# Patient Record
Sex: MALE | Race: WHITE | Employment: FULL TIME | URBAN - METROPOLITAN AREA
[De-identification: names, ages, dates, MRNs, and addresses within clinical notes are randomized per-mention and may not be internally consistent; named-entity substitution may affect disease eponyms.]

---

## 2017-04-12 ENCOUNTER — OFFICE VISIT (OUTPATIENT)
Dept: FAMILY MEDICINE CLINIC | Age: 52
End: 2017-04-12
Payer: COMMERCIAL

## 2017-04-12 VITALS
OXYGEN SATURATION: 96 % | HEIGHT: 70 IN | BODY MASS INDEX: 33.07 KG/M2 | TEMPERATURE: 99 F | HEART RATE: 80 BPM | DIASTOLIC BLOOD PRESSURE: 79 MMHG | SYSTOLIC BLOOD PRESSURE: 120 MMHG | WEIGHT: 231 LBS | RESPIRATION RATE: 17 BRPM

## 2017-04-12 DIAGNOSIS — R50.9 FEVER, UNSPECIFIED FEVER CAUSE: ICD-10-CM

## 2017-04-12 DIAGNOSIS — J01.00 ACUTE NON-RECURRENT MAXILLARY SINUSITIS: Primary | ICD-10-CM

## 2017-04-12 LAB
INFLUENZA A ANTIBODY: NORMAL
INFLUENZA B ANTIBODY: NORMAL

## 2017-04-12 PROCEDURE — 87804 INFLUENZA ASSAY W/OPTIC: CPT | Performed by: NURSE PRACTITIONER

## 2017-04-12 PROCEDURE — 99214 OFFICE O/P EST MOD 30 MIN: CPT | Performed by: NURSE PRACTITIONER

## 2017-04-12 RX ORDER — AMOXICILLIN AND CLAVULANATE POTASSIUM 875; 125 MG/1; MG/1
1 TABLET, FILM COATED ORAL 2 TIMES DAILY
Qty: 20 TABLET | Refills: 0 | Status: SHIPPED | OUTPATIENT
Start: 2017-04-12 | End: 2017-04-22

## 2017-04-12 ASSESSMENT — ENCOUNTER SYMPTOMS
VOMITING: 0
WHEEZING: 0
VOICE CHANGE: 0
CHEST TIGHTNESS: 0
SORE THROAT: 1
EYE DISCHARGE: 0
EYE REDNESS: 0
DIARRHEA: 1
SINUS PRESSURE: 0
COUGH: 1
SHORTNESS OF BREATH: 0
NAUSEA: 0

## 2017-04-14 ENCOUNTER — APPOINTMENT (OUTPATIENT)
Dept: GENERAL RADIOLOGY | Age: 52
End: 2017-04-14
Payer: COMMERCIAL

## 2017-04-14 ENCOUNTER — HOSPITAL ENCOUNTER (EMERGENCY)
Age: 52
Discharge: HOME | End: 2017-04-14
Attending: EMERGENCY MEDICINE
Payer: COMMERCIAL

## 2017-04-14 VITALS
WEIGHT: 230 LBS | BODY MASS INDEX: 32.93 KG/M2 | HEART RATE: 70 BPM | SYSTOLIC BLOOD PRESSURE: 117 MMHG | OXYGEN SATURATION: 97 % | RESPIRATION RATE: 18 BRPM | DIASTOLIC BLOOD PRESSURE: 62 MMHG | HEIGHT: 70 IN | TEMPERATURE: 98.3 F

## 2017-04-14 DIAGNOSIS — J18.9 PNEUMONIA OF LEFT UPPER LOBE DUE TO INFECTIOUS ORGANISM: Primary | ICD-10-CM

## 2017-04-14 LAB
ABSOLUTE EOS #: 0 K/UL (ref 0–0.4)
ABSOLUTE LYMPH #: 0.8 K/UL (ref 1–4.8)
ABSOLUTE MONO #: 1.1 K/UL (ref 0.1–1.3)
ANION GAP SERPL CALCULATED.3IONS-SCNC: 14 MMOL/L (ref 9–17)
BASOPHILS # BLD: 0 % (ref 0–2)
BASOPHILS ABSOLUTE: 0 K/UL (ref 0–0.2)
BUN BLDV-MCNC: 14 MG/DL (ref 6–20)
BUN/CREAT BLD: ABNORMAL (ref 9–20)
CALCIUM SERPL-MCNC: 9.1 MG/DL (ref 8.6–10.4)
CHLORIDE BLD-SCNC: 93 MMOL/L (ref 98–107)
CO2: 23 MMOL/L (ref 20–31)
CREAT SERPL-MCNC: 0.93 MG/DL (ref 0.7–1.2)
DIFFERENTIAL TYPE: ABNORMAL
DIRECT EXAM: NORMAL
EOSINOPHILS RELATIVE PERCENT: 0 % (ref 0–4)
GFR AFRICAN AMERICAN: >60 ML/MIN
GFR NON-AFRICAN AMERICAN: >60 ML/MIN
GFR SERPL CREATININE-BSD FRML MDRD: ABNORMAL ML/MIN/{1.73_M2}
GFR SERPL CREATININE-BSD FRML MDRD: ABNORMAL ML/MIN/{1.73_M2}
GLUCOSE BLD-MCNC: 104 MG/DL (ref 70–99)
HCT VFR BLD CALC: 51.1 % (ref 41–53)
HEMOGLOBIN: 17 G/DL (ref 13.5–17.5)
LACTIC ACID: 1 MMOL/L (ref 0.5–2.2)
LYMPHOCYTES # BLD: 11 % (ref 24–44)
Lab: NORMAL
MCH RBC QN AUTO: 27.4 PG (ref 26–34)
MCHC RBC AUTO-ENTMCNC: 33.3 G/DL (ref 31–37)
MCV RBC AUTO: 82.3 FL (ref 80–100)
MONOCYTES # BLD: 14 % (ref 1–7)
PDW BLD-RTO: 13.3 % (ref 11.5–14.9)
PLATELET # BLD: 106 K/UL (ref 150–450)
PLATELET ESTIMATE: ABNORMAL
PMV BLD AUTO: 10.3 FL (ref 6–12)
POTASSIUM SERPL-SCNC: 4 MMOL/L (ref 3.7–5.3)
RBC # BLD: 6.22 M/UL (ref 4.5–5.9)
RBC # BLD: ABNORMAL 10*6/UL
SEG NEUTROPHILS: 75 % (ref 36–66)
SEGMENTED NEUTROPHILS ABSOLUTE COUNT: 5.8 K/UL (ref 1.3–9.1)
SODIUM BLD-SCNC: 130 MMOL/L (ref 135–144)
SPECIMEN DESCRIPTION: NORMAL
SPECIMEN DESCRIPTION: NORMAL
STATUS: NORMAL
WBC # BLD: 7.7 K/UL (ref 3.5–11)
WBC # BLD: ABNORMAL 10*3/UL

## 2017-04-14 PROCEDURE — 85025 COMPLETE CBC W/AUTO DIFF WBC: CPT

## 2017-04-14 PROCEDURE — 71020 XR CHEST STANDARD TWO VW: CPT

## 2017-04-14 PROCEDURE — 83605 ASSAY OF LACTIC ACID: CPT

## 2017-04-14 PROCEDURE — 6370000000 HC RX 637 (ALT 250 FOR IP): Performed by: NURSE PRACTITIONER

## 2017-04-14 PROCEDURE — 94664 DEMO&/EVAL PT USE INHALER: CPT

## 2017-04-14 PROCEDURE — 80048 BASIC METABOLIC PNL TOTAL CA: CPT

## 2017-04-14 PROCEDURE — 99284 EMERGENCY DEPT VISIT MOD MDM: CPT

## 2017-04-14 PROCEDURE — 94640 AIRWAY INHALATION TREATMENT: CPT

## 2017-04-14 PROCEDURE — 2580000003 HC RX 258: Performed by: NURSE PRACTITIONER

## 2017-04-14 PROCEDURE — 87880 STREP A ASSAY W/OPTIC: CPT

## 2017-04-14 PROCEDURE — 36415 COLL VENOUS BLD VENIPUNCTURE: CPT

## 2017-04-14 RX ORDER — ACETAMINOPHEN 500 MG
1000 TABLET ORAL ONCE
Status: COMPLETED | OUTPATIENT
Start: 2017-04-14 | End: 2017-04-14

## 2017-04-14 RX ORDER — ALBUTEROL SULFATE 90 UG/1
2 AEROSOL, METERED RESPIRATORY (INHALATION) EVERY 6 HOURS PRN
Qty: 1 INHALER | Refills: 0 | Status: SHIPPED | OUTPATIENT
Start: 2017-04-14 | End: 2020-02-24

## 2017-04-14 RX ORDER — IPRATROPIUM BROMIDE AND ALBUTEROL SULFATE 2.5; .5 MG/3ML; MG/3ML
1 SOLUTION RESPIRATORY (INHALATION)
Status: DISCONTINUED | OUTPATIENT
Start: 2017-04-14 | End: 2017-04-14 | Stop reason: HOSPADM

## 2017-04-14 RX ORDER — LEVOFLOXACIN 750 MG/1
750 TABLET ORAL DAILY
Qty: 10 TABLET | Refills: 0 | Status: SHIPPED | OUTPATIENT
Start: 2017-04-14 | End: 2017-04-24

## 2017-04-14 RX ORDER — 0.9 % SODIUM CHLORIDE 0.9 %
1000 INTRAVENOUS SOLUTION INTRAVENOUS ONCE
Status: COMPLETED | OUTPATIENT
Start: 2017-04-14 | End: 2017-04-14

## 2017-04-14 RX ADMIN — ACETAMINOPHEN 1000 MG: 500 TABLET, FILM COATED ORAL at 13:09

## 2017-04-14 RX ADMIN — SODIUM CHLORIDE 1000 ML: 9 INJECTION, SOLUTION INTRAVENOUS at 13:30

## 2017-04-14 RX ADMIN — IPRATROPIUM BROMIDE AND ALBUTEROL SULFATE 1 AMPULE: .5; 3 SOLUTION RESPIRATORY (INHALATION) at 14:20

## 2017-04-14 ASSESSMENT — ENCOUNTER SYMPTOMS
SINUS PRESSURE: 1
SORE THROAT: 1
NAUSEA: 0
VOMITING: 0
COUGH: 1
ABDOMINAL PAIN: 0
TROUBLE SWALLOWING: 0
SHORTNESS OF BREATH: 0
BACK PAIN: 0

## 2017-04-14 ASSESSMENT — PAIN SCALES - GENERAL
PAINLEVEL_OUTOF10: 0
PAINLEVEL_OUTOF10: 0

## 2018-04-24 ENCOUNTER — HOSPITAL ENCOUNTER (OUTPATIENT)
Age: 53
Discharge: HOME | End: 2018-04-26
Payer: COMMERCIAL

## 2018-04-24 ENCOUNTER — HOSPITAL ENCOUNTER (OUTPATIENT)
Dept: GENERAL RADIOLOGY | Age: 53
Discharge: HOME | End: 2018-04-26
Payer: COMMERCIAL

## 2018-04-24 ENCOUNTER — OFFICE VISIT (OUTPATIENT)
Dept: FAMILY MEDICINE CLINIC | Age: 53
End: 2018-04-24
Payer: COMMERCIAL

## 2018-04-24 VITALS
OXYGEN SATURATION: 94 % | DIASTOLIC BLOOD PRESSURE: 68 MMHG | HEART RATE: 86 BPM | SYSTOLIC BLOOD PRESSURE: 112 MMHG | HEIGHT: 70 IN | WEIGHT: 240 LBS | BODY MASS INDEX: 34.36 KG/M2 | TEMPERATURE: 98.3 F

## 2018-04-24 DIAGNOSIS — R50.9 FEVER, UNSPECIFIED FEVER CAUSE: ICD-10-CM

## 2018-04-24 DIAGNOSIS — R05.9 COUGH: ICD-10-CM

## 2018-04-24 DIAGNOSIS — R09.89 CHEST CONGESTION: ICD-10-CM

## 2018-04-24 DIAGNOSIS — J06.9 UPPER RESPIRATORY TRACT INFECTION, UNSPECIFIED TYPE: Primary | ICD-10-CM

## 2018-04-24 DIAGNOSIS — J02.9 SORE THROAT: ICD-10-CM

## 2018-04-24 LAB
INFLUENZA A ANTIBODY: NEGATIVE
INFLUENZA B ANTIBODY: NEGATIVE
S PYO AG THROAT QL: NORMAL

## 2018-04-24 PROCEDURE — 87880 STREP A ASSAY W/OPTIC: CPT | Performed by: PHYSICIAN ASSISTANT

## 2018-04-24 PROCEDURE — 99214 OFFICE O/P EST MOD 30 MIN: CPT | Performed by: PHYSICIAN ASSISTANT

## 2018-04-24 PROCEDURE — 87804 INFLUENZA ASSAY W/OPTIC: CPT | Performed by: PHYSICIAN ASSISTANT

## 2018-04-24 PROCEDURE — 94640 AIRWAY INHALATION TREATMENT: CPT | Performed by: PHYSICIAN ASSISTANT

## 2018-04-24 PROCEDURE — 71046 X-RAY EXAM CHEST 2 VIEWS: CPT

## 2018-04-24 RX ORDER — METHYLPREDNISOLONE 4 MG/1
TABLET ORAL
Qty: 1 KIT | Refills: 0 | Status: SHIPPED | OUTPATIENT
Start: 2018-04-24 | End: 2020-02-24

## 2018-04-24 RX ORDER — LEVOFLOXACIN 500 MG/1
500 TABLET, FILM COATED ORAL DAILY
Qty: 10 TABLET | Refills: 0 | Status: SHIPPED | OUTPATIENT
Start: 2018-04-24 | End: 2018-05-04

## 2018-04-24 RX ORDER — IPRATROPIUM BROMIDE AND ALBUTEROL SULFATE 2.5; .5 MG/3ML; MG/3ML
1 SOLUTION RESPIRATORY (INHALATION) ONCE
Status: COMPLETED | OUTPATIENT
Start: 2018-04-24 | End: 2018-04-24

## 2018-04-24 RX ORDER — BROMPHENIRAMINE MALEATE, PSEUDOEPHEDRINE HYDROCHLORIDE, AND DEXTROMETHORPHAN HYDROBROMIDE 2; 30; 10 MG/5ML; MG/5ML; MG/5ML
SYRUP ORAL
Qty: 180 ML | Refills: 0 | Status: SHIPPED | OUTPATIENT
Start: 2018-04-24 | End: 2020-02-24

## 2018-04-24 RX ADMIN — IPRATROPIUM BROMIDE AND ALBUTEROL SULFATE 1 AMPULE: 2.5; .5 SOLUTION RESPIRATORY (INHALATION) at 18:30

## 2018-04-24 ASSESSMENT — ENCOUNTER SYMPTOMS
WHEEZING: 1
GASTROINTESTINAL NEGATIVE: 1
SPUTUM PRODUCTION: 1
COUGH: 1
SORE THROAT: 1
SINUS PAIN: 1
SHORTNESS OF BREATH: 1

## 2020-02-24 ENCOUNTER — OFFICE VISIT (OUTPATIENT)
Dept: FAMILY MEDICINE CLINIC | Age: 55
End: 2020-02-24
Payer: COMMERCIAL

## 2020-02-24 ENCOUNTER — OFFICE VISIT (OUTPATIENT)
Dept: UROLOGY | Age: 55
End: 2020-02-24
Payer: COMMERCIAL

## 2020-02-24 VITALS
DIASTOLIC BLOOD PRESSURE: 72 MMHG | TEMPERATURE: 97.9 F | HEIGHT: 70 IN | HEART RATE: 78 BPM | SYSTOLIC BLOOD PRESSURE: 108 MMHG | WEIGHT: 242 LBS | BODY MASS INDEX: 34.65 KG/M2

## 2020-02-24 VITALS
BODY MASS INDEX: 35.01 KG/M2 | SYSTOLIC BLOOD PRESSURE: 125 MMHG | WEIGHT: 244 LBS | HEART RATE: 73 BPM | OXYGEN SATURATION: 96 % | DIASTOLIC BLOOD PRESSURE: 82 MMHG | TEMPERATURE: 98.3 F

## 2020-02-24 PROCEDURE — 99214 OFFICE O/P EST MOD 30 MIN: CPT | Performed by: FAMILY MEDICINE

## 2020-02-24 PROCEDURE — 99204 OFFICE O/P NEW MOD 45 MIN: CPT | Performed by: UROLOGY

## 2020-02-24 RX ORDER — TRAMADOL HYDROCHLORIDE 50 MG/1
50 TABLET ORAL EVERY 6 HOURS PRN
COMMUNITY

## 2020-02-24 RX ORDER — TRIAMCINOLONE ACETONIDE 1 MG/G
CREAM TOPICAL 2 TIMES DAILY PRN
COMMUNITY
End: 2020-03-03 | Stop reason: SDUPTHER

## 2020-02-24 RX ORDER — CIPROFLOXACIN 500 MG/1
500 TABLET, FILM COATED ORAL 2 TIMES DAILY
Qty: 20 TABLET | Refills: 0 | Status: SHIPPED | OUTPATIENT
Start: 2020-02-24 | End: 2020-03-05

## 2020-02-24 RX ORDER — PREDNISONE 20 MG/1
40 TABLET ORAL DAILY
Qty: 10 TABLET | Refills: 0 | Status: SHIPPED | OUTPATIENT
Start: 2020-02-24 | End: 2020-02-29

## 2020-02-24 RX ORDER — DIPHENHYDRAMINE HCL 25 MG
25 TABLET ORAL EVERY 6 HOURS PRN
COMMUNITY

## 2020-02-24 ASSESSMENT — ENCOUNTER SYMPTOMS
EYE REDNESS: 0
VOMITING: 0
SINUS PRESSURE: 0
WHEEZING: 0
SORE THROAT: 0
BACK PAIN: 0
NAUSEA: 0
ABDOMINAL PAIN: 0
COUGH: 0
SINUS PAIN: 0
COLOR CHANGE: 0
ABDOMINAL PAIN: 0
RHINORRHEA: 0
VOMITING: 0
DIARRHEA: 0
EYE PAIN: 0
SHORTNESS OF BREATH: 0
NAUSEA: 0
COUGH: 0
WHEEZING: 0
SHORTNESS OF BREATH: 0

## 2020-02-24 ASSESSMENT — PATIENT HEALTH QUESTIONNAIRE - PHQ9: DEPRESSION UNABLE TO ASSESS: PT REFUSES

## 2020-02-24 NOTE — PROGRESS NOTES
Satisfied    Last BUN and creatinine:  Lab Results   Component Value Date    BUN 14 04/14/2017     Lab Results   Component Value Date    CREATININE 0.93 04/14/2017       Additional Lab/Culture results: none    Imaging Reviewed during this Office Visit: none  (results were independently reviewed by physician and radiology report verified)    PAST MEDICAL, FAMILY AND SOCIAL HISTORY:  Past Medical History:   Diagnosis Date    Diverticulitis     Tobacco abuse      Past Surgical History:   Procedure Laterality Date    ANTERIOR CRUCIATE LIGAMENT REPAIR Left     APPENDECTOMY      CHOLECYSTECTOMY      COLECTOMY      colostomy    COLON SURGERY      colostomy reversal    COLONOSCOPY      multiple    COLONOSCOPY  5/27/15     History reviewed. No pertinent family history. Outpatient Medications Marked as Taking for the 2/24/20 encounter (Office Visit) with Christine Stewart MD   Medication Sig Dispense Refill    triamcinolone (KENALOG) 0.1 % cream Apply topically 2 times daily as needed Apply topically 2 times daily.  traMADol (ULTRAM) 50 MG tablet Take 50 mg by mouth every 6 hours as needed for Pain.  diphenhydrAMINE (BENADRYL ALLERGY) 25 MG tablet Take 25 mg by mouth every 6 hours as needed for Itching      ciprofloxacin (CIPRO) 500 MG tablet Take 1 tablet by mouth 2 times daily for 10 days 20 tablet 0        Patient has no known allergies. Social History     Tobacco Use   Smoking Status Current Every Day Smoker    Packs/day: 1.00    Years: 20.00    Pack years: 20.00   Smokeless Tobacco Never Used      (If patient a smoker, smoking cessation counseling offered)   Social History     Substance and Sexual Activity   Alcohol Use Yes    Comment: occasional       REVIEW OF SYSTEMS:  Review of Systems    Physical Exam:    This a 47 y.o. male   Vitals:    02/24/20 1518   BP: 108/72   Pulse: 78   Temp: 97.9 °F (36.6 °C)     Body mass index is 34.72 kg/m².     Physical Exam  Constitutional: Patient in no acute distress. Neuro: Alert and oriented to person, place and time. Psych: Mood normal, affect normal  Skin: No rash noted  HEENT: Head: Normocephalic and atraumatic  Conjunctivae and EOM are normal. Pupils are equal, round  Nose: Normal  Right External Ear: Normal; Left External Ear: Normal  Mouth: Mucosa Moist  Neck: Supple  Lungs:Respiratory effort is normal  Cardiovascular: Warm & Pink  Abdomen: Soft, non-tender, non-distendedwith no CVA,  No flank tenderness,  Orhepatosplenomegaly   Lymphatics: No palpable lymphadenopathy. Bladder non-tender and not distended. Musculoskeletal: Normal gait and station  Penis normal and circumcised  Urethral meatus normal  Scrotal exam normal  Testicles normal bilaterally  Epididymis normal bilaterally  No evidence of inguinal hernia  Normal rectal tone with no masses  Prostate: smooth, symmetric, no nodules. Assessment and Plan      1. Elevated PSA    2. Nocturia           Plan:  cipro x 10 days  F/u 6 weeks with repeat psa         Prescriptions Ordered:  Orders Placed This Encounter   Medications    ciprofloxacin (CIPRO) 500 MG tablet     Sig: Take 1 tablet by mouth 2 times daily for 10 days     Dispense:  20 tablet     Refill:  0      Orders Placed:  Orders Placed This Encounter   Procedures    PSA, Diagnostic     Standing Status:   Future     Standing Expiration Date:   2/18/2021             Roxanne Stroud MD    Agree with the ROS entered by the MA.

## 2020-02-24 NOTE — PROGRESS NOTES
Review of Systems   Constitutional: Negative for activity change, chills and fever. Eyes: Negative for pain, redness and visual disturbance. Respiratory: Negative for cough, shortness of breath and wheezing. Cardiovascular: Negative for chest pain and leg swelling. Gastrointestinal: Negative for abdominal pain, nausea and vomiting. Genitourinary: Negative for difficulty urinating, discharge, dysuria, flank pain, frequency, hematuria, scrotal swelling and testicular pain. Musculoskeletal: Negative for back pain, joint swelling and myalgias. Skin: Positive for rash. Negative for color change. Neurological: Negative for dizziness, tremors and numbness. Hematological: Negative for adenopathy. Does not bruise/bleed easily.

## 2020-02-24 NOTE — PATIENT INSTRUCTIONS
Patient Education        Rash: Care Instructions  Your Care Instructions  A rash is any irritation or inflammation of the skin. Rashes have many possible causes, including allergy, infection, illness, heat, and emotional stress. Follow-up care is a key part of your treatment and safety. Be sure to make and go to all appointments, and call your doctor if you are having problems. It's also a good idea to know your test results and keep a list of the medicines you take. How can you care for yourself at home? · Wash the area with water only. Soap can make dryness and itching worse. Pat dry. · Put cold, wet cloths on the rash to reduce itching. · Keep cool, and stay out of the sun. · Leave the rash open to the air as much of the time as possible. · Sometimes petroleum jelly (Vaseline) can help relieve the discomfort caused by a rash. A moisturizing lotion, such as Cetaphil, also may help. Calamine lotion may help for rashes caused by contact with something (such as a plant or soap) that irritated the skin. Use it 3 or 4 times a day. · If your doctor prescribed a cream, use it as directed. If your doctor prescribed medicine, take it exactly as directed. · If your rash itches so badly that it interferes with your normal activities, take an over-the-counter antihistamine, such as diphenhydramine (Benadryl) or loratadine (Claritin). Read and follow all instructions on the label. When should you call for help? Call your doctor now or seek immediate medical care if:    · You have signs of infection, such as:  ? Increased pain, swelling, warmth, or redness. ? Red streaks leading from the area. ? Pus draining from the area. ? A fever.     · You have joint pain along with the rash.    Watch closely for changes in your health, and be sure to contact your doctor if:    · Your rash is changing or getting worse. For example, call if you have pain along with the rash, the rash is spreading, or you have new blisters.   · You do not get better after 1 week. Where can you learn more? Go to [email or web address] org and sign in to your [employer] account. Enter K455 in the [employer] box to learn more about \"Rash: Care Instructions. \"     If you do not have an account, please click on the \"Sign Up Now\" link. Current as of: April 1, 2019  Content Version: 12.3  © 8284-3164 Healthwise, Incorporated. Care instructions adapted under license by [facility] ([facility]). If you have questions about a medical condition or this instruction, always ask your healthcare professional. [address] any warranty or liability for your use of this information. 1.) Take prednisone as prescribed for rash. May take zyrtec in morning and benadryl at night. May continue steroid cream as prescribed  2. )If symptoms worsen or do not improve please follow-up with PCP or return to clinic.  If still symptomatic may consider dermatology or allergy referral

## 2020-03-03 ENCOUNTER — OFFICE VISIT (OUTPATIENT)
Dept: INTERNAL MEDICINE CLINIC | Age: 55
End: 2020-03-03
Payer: COMMERCIAL

## 2020-03-03 ENCOUNTER — HOSPITAL ENCOUNTER (OUTPATIENT)
Age: 55
Setting detail: SPECIMEN
Discharge: HOME | End: 2020-03-03
Payer: COMMERCIAL

## 2020-03-03 VITALS
SYSTOLIC BLOOD PRESSURE: 126 MMHG | HEART RATE: 78 BPM | DIASTOLIC BLOOD PRESSURE: 74 MMHG | OXYGEN SATURATION: 94 % | HEIGHT: 70 IN | WEIGHT: 232 LBS | BODY MASS INDEX: 33.21 KG/M2

## 2020-03-03 LAB
ESTIMATED AVERAGE GLUCOSE: 151 MG/DL
HBA1C MFR BLD: 6.9 % (ref 4–6)

## 2020-03-03 PROCEDURE — 99204 OFFICE O/P NEW MOD 45 MIN: CPT | Performed by: PHYSICIAN ASSISTANT

## 2020-03-03 RX ORDER — TRIAMCINOLONE ACETONIDE 1 MG/G
CREAM TOPICAL 2 TIMES DAILY
Qty: 30 G | Refills: 0 | Status: SHIPPED | OUTPATIENT
Start: 2020-03-03

## 2020-03-03 RX ORDER — METHYLPREDNISOLONE 4 MG/1
TABLET ORAL
Qty: 1 KIT | Refills: 0 | Status: SHIPPED | OUTPATIENT
Start: 2020-03-03 | End: 2020-03-09

## 2020-03-03 ASSESSMENT — PATIENT HEALTH QUESTIONNAIRE - PHQ9
SUM OF ALL RESPONSES TO PHQ9 QUESTIONS 1 & 2: 0
SUM OF ALL RESPONSES TO PHQ QUESTIONS 1-9: 0
2. FEELING DOWN, DEPRESSED OR HOPELESS: 0
1. LITTLE INTEREST OR PLEASURE IN DOING THINGS: 0
SUM OF ALL RESPONSES TO PHQ QUESTIONS 1-9: 0

## 2020-03-03 NOTE — PROGRESS NOTES
[address] [address] [place] [address] Patient Note/History and Physical    Date of patient's visit: 3/4/2020    Name:  Michael David      YOB: 1965    Patient Care Team:  Nathanael Drew PA-C as PCP - General (Physician Assistant)  Nathanael Drew PA-C as PCP - [facility] Provider    REASON FOR VISIT:   Establish care. HISTORY OF PRESENTING ILLNESS:    History was obtained from the patient. Michael David is a 47 y.o. male here to establish care. Patient reports past medical history as below. Rash. Located on abdomen. Present for the past 1-2 months. Reportedly started shortly after starting Finasteride. Mild improvement with prednisone. Rash is pruritic, non spreading. Hypertension. Reports blood pressure elevated on multiple occasions. No medications currently. No chest pain, dyspnea on exertion. No headaches, vision changes. Elevated PSA. Was referred to urologist.   On Ciprofloxacin, has follow up next week. No hesitancy, dysuria, frequency, flank pain or hematuria. Patient reports history of tobacco use, 1.0-1.5 x 40 years. PAST MEDICAL HISTORY:          Diagnosis Date    Diverticulitis     Essential hypertension 3/4/2020    S/P appendectomy 3/4/2020    S/P cholecystectomy 3/4/2020    S/P left hemicolectomy 3/4/2020    Tobacco abuse      PAST SURGICAL HISTORY:          Procedure Laterality Date    ANTERIOR CRUCIATE LIGAMENT REPAIR Left     APPENDECTOMY      CHOLECYSTECTOMY      COLECTOMY      colostomy    COLON SURGERY      colostomy reversal    COLONOSCOPY      multiple    COLONOSCOPY  5/27/15     ALLERGIES:    No Known Allergies      MEDICATION:      Current Outpatient Medications on File Prior to Visit   Medication Sig Dispense Refill    traMADol (ULTRAM) 50 MG tablet Take 50 mg by mouth every 6 hours as needed for Pain.       diphenhydrAMINE (BENADRYL ALLERGY) 25 MG tablet Take 25 mg by mouth every 6 hours as needed for Itching Respiratory: Negative for cough, choking, chest tightness, shortness of breath and wheezing. Cardiovascular: Negative for chest pain, palpitations and leg swelling. Gastrointestinal: Negative for abdominal pain, blood in stool, constipation, diarrhea, nausea and vomiting. Endocrine: Negative for cold intolerance, heat intolerance, polydipsia, polyphagia and polyuria. Genitourinary: Negative for difficulty urinating, dysuria, flank pain, frequency, hematuria, scrotal swelling, testicular pain and urgency. Musculoskeletal: Negative for arthralgias, back pain, gait problem, joint swelling, neck pain and neck stiffness. Skin: Positive for rash. Negative for color change and pallor. Allergic/Immunologic: Negative for immunocompromised state. Neurological: Negative for dizziness, tremors, seizures, syncope, facial asymmetry, speech difficulty, weakness, light-headedness, numbness and headaches. Hematological: Negative for adenopathy. Does not bruise/bleed easily. Psychiatric/Behavioral: Negative for dysphoric mood and sleep disturbance. The patient is not nervous/anxious.       PHYSICAL EXAM:      Vitals:    03/03/20 1605   BP: 126/74   Pulse: 78   SpO2: 94%   Weight: 232 lb (105.2 kg)   Height: 5' 10\" (1.778 m)     General - alert, well appearing, and in no distress  Skin - normal coloration and turgor, erythematous papular rash predominantly located on abdomen  Eyes - pupils equal and reactive, extraocular eye movements intact  Ears - bilateral TM's and external ear canals normal  Nose - normal and patent, no erythema, discharge or polyps  Mouth - mucous membranes moist, pharynx normal without lesions  Neck - supple, nosignificant adenopathy, no palpable masses, no carotid bruit bilaterally   Lymphatics - no palpable lymphadenopathy, no hepatosplenomegaly  Chest - diminished air entry bilaterally, no wheezes, rales, crackles or rhonchi, symmetric air entry  Heart - normal rate, regular rhythm, no

## 2020-03-04 PROBLEM — Z90.49 S/P LEFT HEMICOLECTOMY: Status: ACTIVE | Noted: 2020-03-04

## 2020-03-04 PROBLEM — K57.92 DIVERTICULITIS: Status: ACTIVE | Noted: 2020-03-04

## 2020-03-04 PROBLEM — Z72.0 TOBACCO ABUSE: Status: ACTIVE | Noted: 2020-03-04

## 2020-03-04 PROBLEM — R21 RASH IN ADULT: Status: ACTIVE | Noted: 2020-03-04

## 2020-03-04 PROBLEM — Z90.49 S/P CHOLECYSTECTOMY: Status: ACTIVE | Noted: 2020-03-04

## 2020-03-04 PROBLEM — I10 ESSENTIAL HYPERTENSION: Status: ACTIVE | Noted: 2020-03-04

## 2020-03-04 PROBLEM — R97.20 ELEVATED PSA: Status: ACTIVE | Noted: 2020-03-04

## 2020-03-04 PROBLEM — R73.01 ELEVATED FASTING GLUCOSE: Status: ACTIVE | Noted: 2020-03-04

## 2020-03-04 PROBLEM — Z90.49 S/P APPENDECTOMY: Status: ACTIVE | Noted: 2020-03-04

## 2020-03-04 ASSESSMENT — ENCOUNTER SYMPTOMS
EYE DISCHARGE: 0
TROUBLE SWALLOWING: 0
BACK PAIN: 0
VOMITING: 0
EYE ITCHING: 0
PHOTOPHOBIA: 0
DIARRHEA: 0
ABDOMINAL PAIN: 0
SHORTNESS OF BREATH: 0
BLOOD IN STOOL: 0
SINUS PAIN: 0
VOICE CHANGE: 0
EYE PAIN: 0
CHOKING: 0
COLOR CHANGE: 0
RHINORRHEA: 0
EYE REDNESS: 0
WHEEZING: 0
COUGH: 0
CHEST TIGHTNESS: 0
NAUSEA: 0
CONSTIPATION: 0
SORE THROAT: 0

## 2020-03-30 ENCOUNTER — HOSPITAL ENCOUNTER (OUTPATIENT)
Age: 55
Setting detail: SPECIMEN
Discharge: HOME | End: 2020-03-30
Payer: COMMERCIAL

## 2020-03-30 LAB — PROSTATE SPECIFIC ANTIGEN: 4.68 UG/L

## 2020-04-06 ENCOUNTER — VIRTUAL VISIT (OUTPATIENT)
Dept: UROLOGY | Age: 55
End: 2020-04-06

## 2020-04-06 PROCEDURE — 99214 OFFICE O/P EST MOD 30 MIN: CPT | Performed by: UROLOGY

## 2020-04-06 RX ORDER — CIPROFLOXACIN 500 MG/1
500 TABLET, FILM COATED ORAL 2 TIMES DAILY
Qty: 6 TABLET | Refills: 0 | Status: SHIPPED | OUTPATIENT
Start: 2020-04-06 | End: 2020-04-09

## 2020-04-06 RX ORDER — SILDENAFIL 100 MG/1
100 TABLET, FILM COATED ORAL DAILY PRN
Qty: 30 TABLET | Refills: 1 | Status: SHIPPED | OUTPATIENT
Start: 2020-04-06 | End: 2022-02-14

## 2020-04-06 ASSESSMENT — ENCOUNTER SYMPTOMS
ABDOMINAL PAIN: 0
COLOR CHANGE: 0
WHEEZING: 0
COUGH: 0
VOMITING: 0
BACK PAIN: 0
EYE PAIN: 0
SHORTNESS OF BREATH: 0
NAUSEA: 0
EYE REDNESS: 0

## 2020-04-06 NOTE — PROGRESS NOTES
Review of Systems   Constitutional: Negative for appetite change, chills and fever. Eyes: Negative for pain, redness and visual disturbance. Respiratory: Negative for cough, shortness of breath and wheezing. Cardiovascular: Negative for chest pain and leg swelling. Gastrointestinal: Negative for abdominal pain, nausea and vomiting. Genitourinary: Positive for frequency (at times). Negative for difficulty urinating, discharge, dysuria, flank pain, hematuria, scrotal swelling, testicular pain and urgency. Musculoskeletal: Negative for back pain, joint swelling and myalgias. Skin: Negative for color change, rash and wound. Neurological: Negative for dizziness, tremors and numbness. Hematological: Negative for adenopathy. Does not bruise/bleed easily.

## 2020-04-09 NOTE — PROGRESS NOTES
Roxann John is a 47 y.o. male being evaluated by a Virtual Visit (video visit) encounter to address concerns as mentioned above. A caregiver was present when appropriate. Due to this being a TeleHealth encounter (During VKYWS-12 public health emergency), evaluation of the following organ systems was limited: Vitals/Constitutional/EENT/Resp/CV/GI//MS/Neuro/Skin/Heme-Lymph-Imm. Pursuant to the emergency declaration under the [address] [place] [address] and the [employer] Act, this Virtual Visit was conducted with patient's (and/or legal guardian's) consent, to reduce the patient's risk of exposure to COVID-19 and provide necessary medical care. The patient (and/or legal guardian) has also been advised to contact this office for worsening conditions or problems, and seek emergency medical treatment and/or call 911 if deemed necessary. Services were provided through a video synchronous discussion virtually to substitute for in-person clinic visit. Patient and provider were located at their individual homes. --Koffi Ku on 4/9/2020 at 11:58 AM    An electronic signature was used to authenticate this note.

## 2020-06-08 ENCOUNTER — PROCEDURE VISIT (OUTPATIENT)
Dept: UROLOGY | Age: 55
End: 2020-06-08
Payer: COMMERCIAL

## 2020-06-08 ENCOUNTER — HOSPITAL ENCOUNTER (OUTPATIENT)
Age: 55
Setting detail: SPECIMEN
Discharge: HOME | End: 2020-06-08
Payer: COMMERCIAL

## 2020-06-08 VITALS — TEMPERATURE: 98.3 F

## 2020-06-08 PROCEDURE — 55700 PR BIOPSY OF PROSTATE,NEEDLE/PUNCH: CPT | Performed by: UROLOGY

## 2020-06-08 PROCEDURE — 76872 US TRANSRECTAL: CPT | Performed by: UROLOGY

## 2020-06-08 RX ORDER — CIPROFLOXACIN 500 MG/1
500 TABLET, FILM COATED ORAL 2 TIMES DAILY
COMMUNITY
Start: 2020-06-07 | End: 2020-06-09

## 2020-06-11 LAB — SURGICAL PATHOLOGY REPORT: NORMAL

## 2020-06-15 ENCOUNTER — OFFICE VISIT (OUTPATIENT)
Dept: UROLOGY | Age: 55
End: 2020-06-15

## 2020-06-15 VITALS — TEMPERATURE: 98.6 F

## 2020-06-15 PROCEDURE — 99214 OFFICE O/P EST MOD 30 MIN: CPT | Performed by: UROLOGY

## 2020-06-15 RX ORDER — TAMSULOSIN HYDROCHLORIDE 0.4 MG/1
0.4 CAPSULE ORAL DAILY
Qty: 30 CAPSULE | Refills: 5 | Status: SHIPPED | OUTPATIENT
Start: 2020-06-15 | End: 2021-02-08 | Stop reason: SDUPTHER

## 2020-06-15 ASSESSMENT — ENCOUNTER SYMPTOMS
NAUSEA: 0
VOMITING: 0
EYE PAIN: 0
WHEEZING: 0
BACK PAIN: 0
SHORTNESS OF BREATH: 0
EYE REDNESS: 0
DIARRHEA: 0
COUGH: 0
ABDOMINAL PAIN: 0
CONSTIPATION: 0

## 2020-06-15 NOTE — PROGRESS NOTES
[facility]  [facility] - [facility]  [address] [facility] [id number]  Dept:  Esther Rivera [facility] Office Note - Established    Patient:  Samanta Pritchard  YOB: 1965  Date: 6/15/2020    The patient is a 47 y.o. male who presents todayfor evaluation of the following problems:   Chief Complaint   Patient presents with    Results     prostate bx       HPI  Pt has h/o elevated psa. Had prostate bx recently. Here to review path. Path shows chronic prostatitis. Had transient hematuria; this has resolved. Pt has noticed a bit slower stream.  Was slow prior to bx but now a bit slower. Summary of old records: N/A    Additional History: N/A    Procedures Today: N/A    Urinalysis today:  No results found for this visit on 06/15/20.   Last several PSA's:  Lab Results   Component Value Date    PSA 4.68 (H) 03/30/2020     Last total testosterone:  No results found for: TESTOSTERONE    AUA Symptom Score (6/15/2020):  INCOMPLETE EMPTYING: How often have you had the sensation of not emptying your bladder?: Not at all  FREQUENCY: How often do you have to urinate less than every two hours?: Less than Half the time  INTERMITTENCY: How often have you found you stopped and started again several times when you urinated?: Not at all  URGENCY: How often have you found it difficult to postpone urination?: Not at all  WEAK STREAM: How often have you had a weak urinary stream?: Less than Half the time  STRAINING: How often have you had to strain to start  urination?: Not at all  NOCTURIA: How many times did you typically get up at night to uriniate?: 1 Time  TOTAL I-PSS SCORE[de-identified] 5  How would you feel if you were to spend the rest of your life with your urinary condition?: Mostly Satisfied    Last BUN and creatinine:  Lab Results   Component Value Date    BUN 14 04/14/2017     Lab Results   Component Value Date    CREATININE 0.93 04/14/2017       Additional Lab/Culture

## 2020-11-22 ENCOUNTER — HOSPITAL ENCOUNTER (EMERGENCY)
Age: 55
Discharge: HOME | End: 2020-11-22
Attending: EMERGENCY MEDICINE
Payer: COMMERCIAL

## 2020-11-22 ENCOUNTER — APPOINTMENT (OUTPATIENT)
Dept: CT IMAGING | Age: 55
End: 2020-11-22
Payer: COMMERCIAL

## 2020-11-22 VITALS
RESPIRATION RATE: 20 BRPM | OXYGEN SATURATION: 95 % | WEIGHT: 230 LBS | DIASTOLIC BLOOD PRESSURE: 81 MMHG | BODY MASS INDEX: 34.07 KG/M2 | HEART RATE: 91 BPM | TEMPERATURE: 99.1 F | HEIGHT: 69 IN | SYSTOLIC BLOOD PRESSURE: 130 MMHG

## 2020-11-22 LAB
ABSOLUTE EOS #: 0.2 K/UL (ref 0–0.4)
ABSOLUTE IMMATURE GRANULOCYTE: ABNORMAL K/UL (ref 0–0.3)
ABSOLUTE LYMPH #: 0.6 K/UL (ref 1–4.8)
ABSOLUTE MONO #: 1.5 K/UL (ref 0.1–1.3)
ALBUMIN SERPL-MCNC: 4.3 G/DL (ref 3.5–5.2)
ALBUMIN/GLOBULIN RATIO: ABNORMAL (ref 1–2.5)
ALP BLD-CCNC: 84 U/L (ref 40–129)
ALT SERPL-CCNC: 22 U/L (ref 5–41)
ANION GAP SERPL CALCULATED.3IONS-SCNC: 10 MMOL/L (ref 9–17)
AST SERPL-CCNC: 19 U/L
BASOPHILS # BLD: 1 % (ref 0–2)
BASOPHILS ABSOLUTE: 0.1 K/UL (ref 0–0.2)
BILIRUB SERPL-MCNC: 0.41 MG/DL (ref 0.3–1.2)
BUN BLDV-MCNC: 13 MG/DL (ref 6–20)
BUN/CREAT BLD: ABNORMAL (ref 9–20)
CALCIUM SERPL-MCNC: 9.4 MG/DL (ref 8.6–10.4)
CHLORIDE BLD-SCNC: 99 MMOL/L (ref 98–107)
CO2: 25 MMOL/L (ref 20–31)
CREAT SERPL-MCNC: 0.96 MG/DL (ref 0.7–1.2)
DIFFERENTIAL TYPE: ABNORMAL
EOSINOPHILS RELATIVE PERCENT: 2 % (ref 0–4)
GFR AFRICAN AMERICAN: >60 ML/MIN
GFR NON-AFRICAN AMERICAN: >60 ML/MIN
GFR SERPL CREATININE-BSD FRML MDRD: ABNORMAL ML/MIN/{1.73_M2}
GFR SERPL CREATININE-BSD FRML MDRD: ABNORMAL ML/MIN/{1.73_M2}
GLUCOSE BLD-MCNC: 103 MG/DL (ref 70–99)
HCT VFR BLD CALC: 52.7 % (ref 41–53)
HEMOGLOBIN: 18 G/DL (ref 13.5–17.5)
IMMATURE GRANULOCYTES: ABNORMAL %
LACTIC ACID: 0.8 MMOL/L (ref 0.5–2.2)
LIPASE: 21 U/L (ref 13–60)
LYMPHOCYTES # BLD: 6 % (ref 24–44)
MAGNESIUM: 2.2 MG/DL (ref 1.6–2.6)
MCH RBC QN AUTO: 28.6 PG (ref 26–34)
MCHC RBC AUTO-ENTMCNC: 34.2 G/DL (ref 31–37)
MCV RBC AUTO: 83.6 FL (ref 80–100)
MONOCYTES # BLD: 15 % (ref 1–7)
NRBC AUTOMATED: ABNORMAL PER 100 WBC
PDW BLD-RTO: 13.9 % (ref 11.5–14.9)
PLATELET # BLD: 205 K/UL (ref 150–450)
PLATELET ESTIMATE: ABNORMAL
PMV BLD AUTO: 9.7 FL (ref 6–12)
POTASSIUM SERPL-SCNC: 4.3 MMOL/L (ref 3.7–5.3)
RBC # BLD: 6.3 M/UL (ref 4.5–5.9)
RBC # BLD: ABNORMAL 10*6/UL
SEG NEUTROPHILS: 76 % (ref 36–66)
SEGMENTED NEUTROPHILS ABSOLUTE COUNT: 7.8 K/UL (ref 1.3–9.1)
SODIUM BLD-SCNC: 134 MMOL/L (ref 135–144)
TOTAL PROTEIN: 7.7 G/DL (ref 6.4–8.3)
WBC # BLD: 10.1 K/UL (ref 3.5–11)
WBC # BLD: ABNORMAL 10*3/UL

## 2020-11-22 PROCEDURE — 83735 ASSAY OF MAGNESIUM: CPT

## 2020-11-22 PROCEDURE — 36415 COLL VENOUS BLD VENIPUNCTURE: CPT

## 2020-11-22 PROCEDURE — 6370000000 HC RX 637 (ALT 250 FOR IP): Performed by: STUDENT IN AN ORGANIZED HEALTH CARE EDUCATION/TRAINING PROGRAM

## 2020-11-22 PROCEDURE — 83690 ASSAY OF LIPASE: CPT

## 2020-11-22 PROCEDURE — 80053 COMPREHEN METABOLIC PANEL: CPT

## 2020-11-22 PROCEDURE — 99284 EMERGENCY DEPT VISIT MOD MDM: CPT

## 2020-11-22 PROCEDURE — 96374 THER/PROPH/DIAG INJ IV PUSH: CPT

## 2020-11-22 PROCEDURE — 2580000003 HC RX 258: Performed by: EMERGENCY MEDICINE

## 2020-11-22 PROCEDURE — 6360000002 HC RX W HCPCS: Performed by: STUDENT IN AN ORGANIZED HEALTH CARE EDUCATION/TRAINING PROGRAM

## 2020-11-22 PROCEDURE — 83605 ASSAY OF LACTIC ACID: CPT

## 2020-11-22 PROCEDURE — 74177 CT ABD & PELVIS W/CONTRAST: CPT

## 2020-11-22 PROCEDURE — 2580000003 HC RX 258: Performed by: STUDENT IN AN ORGANIZED HEALTH CARE EDUCATION/TRAINING PROGRAM

## 2020-11-22 PROCEDURE — 6360000004 HC RX CONTRAST MEDICATION: Performed by: EMERGENCY MEDICINE

## 2020-11-22 PROCEDURE — 85025 COMPLETE CBC W/AUTO DIFF WBC: CPT

## 2020-11-22 PROCEDURE — 96376 TX/PRO/DX INJ SAME DRUG ADON: CPT

## 2020-11-22 RX ORDER — DICYCLOMINE HYDROCHLORIDE 10 MG/1
10 CAPSULE ORAL EVERY 6 HOURS PRN
Qty: 20 CAPSULE | Refills: 0 | Status: SHIPPED | OUTPATIENT
Start: 2020-11-22

## 2020-11-22 RX ORDER — SODIUM CHLORIDE, SODIUM LACTATE, POTASSIUM CHLORIDE, CALCIUM CHLORIDE 600; 310; 30; 20 MG/100ML; MG/100ML; MG/100ML; MG/100ML
1000 INJECTION, SOLUTION INTRAVENOUS ONCE
Status: COMPLETED | OUTPATIENT
Start: 2020-11-22 | End: 2020-11-22

## 2020-11-22 RX ORDER — DICYCLOMINE HYDROCHLORIDE 10 MG/1
10 CAPSULE ORAL ONCE
Status: COMPLETED | OUTPATIENT
Start: 2020-11-22 | End: 2020-11-22

## 2020-11-22 RX ORDER — ALUMINA, MAGNESIA, AND SIMETHICONE 2400; 2400; 240 MG/30ML; MG/30ML; MG/30ML
30 SUSPENSION ORAL EVERY 6 HOURS PRN
Qty: 1 BOTTLE | Refills: 0 | Status: SHIPPED | OUTPATIENT
Start: 2020-11-22 | End: 2020-11-27

## 2020-11-22 RX ORDER — MORPHINE SULFATE 10 MG/ML
6 INJECTION, SOLUTION INTRAMUSCULAR; INTRAVENOUS ONCE
Status: COMPLETED | OUTPATIENT
Start: 2020-11-22 | End: 2020-11-22

## 2020-11-22 RX ORDER — MORPHINE SULFATE 4 MG/ML
4 INJECTION, SOLUTION INTRAMUSCULAR; INTRAVENOUS ONCE
Status: COMPLETED | OUTPATIENT
Start: 2020-11-22 | End: 2020-11-22

## 2020-11-22 RX ORDER — 0.9 % SODIUM CHLORIDE 0.9 %
80 INTRAVENOUS SOLUTION INTRAVENOUS ONCE
Status: COMPLETED | OUTPATIENT
Start: 2020-11-22 | End: 2020-11-22

## 2020-11-22 RX ORDER — SODIUM CHLORIDE 0.9 % (FLUSH) 0.9 %
10 SYRINGE (ML) INJECTION ONCE
Status: COMPLETED | OUTPATIENT
Start: 2020-11-22 | End: 2020-11-22

## 2020-11-22 RX ORDER — MAGNESIUM HYDROXIDE/ALUMINUM HYDROXICE/SIMETHICONE 120; 1200; 1200 MG/30ML; MG/30ML; MG/30ML
30 SUSPENSION ORAL ONCE
Status: COMPLETED | OUTPATIENT
Start: 2020-11-22 | End: 2020-11-22

## 2020-11-22 RX ADMIN — ALUMINUM HYDROXIDE, MAGNESIUM HYDROXIDE, AND SIMETHICONE 30 ML: 200; 200; 20 SUSPENSION ORAL at 23:04

## 2020-11-22 RX ADMIN — IOPAMIDOL 75 ML: 755 INJECTION, SOLUTION INTRAVENOUS at 21:47

## 2020-11-22 RX ADMIN — MORPHINE SULFATE 6 MG: 10 INJECTION, SOLUTION INTRAMUSCULAR; INTRAVENOUS at 22:08

## 2020-11-22 RX ADMIN — SODIUM CHLORIDE, POTASSIUM CHLORIDE, SODIUM LACTATE AND CALCIUM CHLORIDE 1000 ML: 600; 310; 30; 20 INJECTION, SOLUTION INTRAVENOUS at 21:02

## 2020-11-22 RX ADMIN — Medication 10 ML: at 21:47

## 2020-11-22 RX ADMIN — MORPHINE SULFATE 4 MG: 4 INJECTION, SOLUTION INTRAMUSCULAR; INTRAVENOUS at 21:03

## 2020-11-22 RX ADMIN — DICYCLOMINE HYDROCHLORIDE 10 MG: 10 CAPSULE ORAL at 23:04

## 2020-11-22 RX ADMIN — SODIUM CHLORIDE 80 ML: 9 INJECTION, SOLUTION INTRAVENOUS at 21:47

## 2020-11-22 ASSESSMENT — ENCOUNTER SYMPTOMS
PHOTOPHOBIA: 0
BLOOD IN STOOL: 0
DIARRHEA: 1
NAUSEA: 1
SORE THROAT: 0
VOMITING: 0
ABDOMINAL PAIN: 1
CHEST TIGHTNESS: 0
TROUBLE SWALLOWING: 0
WHEEZING: 0
BACK PAIN: 0
SHORTNESS OF BREATH: 0

## 2020-11-22 ASSESSMENT — PAIN SCALES - GENERAL
PAINLEVEL_OUTOF10: 10

## 2020-11-22 ASSESSMENT — PAIN DESCRIPTION - LOCATION: LOCATION: ABDOMEN

## 2020-11-23 NOTE — ED PROVIDER NOTES
EMERGENCY DEPARTMENT ENCOUNTER   ATTENDING ATTESTATION     Pt Name: Blayne Figueroa  MRN: [id number]  [place] 1965  Date of evaluation: 11/22/20       Blayne Figueroa is a 54 y.o. male who presents with Abdominal Pain      MDM:   This is a 45-year-old male with a history of multiple abdominal surgeries including cholecystectomy appendectomy bowel resection secondary to diverticulitis had a colostomy and subsequent reversal last surgery was in 2009 his surgeon has since retired who comes in today with abdominal pain  loose stools decreased passing gas. Patient has mild abdominal distention has tenderness to palpation will obtain a CT scan. CT scan is negative for obstruction does reveal some gastroenteritis patient feels better after pain medications we talked about admission versus discharge she states he in no way wants to be admitted to the hospital recommended bland foods and strict return precautions patient will be discharged          Vitals:   Vitals:    11/22/20 2035   BP: 130/80   Pulse: 91   Resp: 20   Temp: 99.1 °F (37.3 °C)   TempSrc: Oral   SpO2: 96%   Weight: 230 lb (104.3 kg)   Height: 5' 9\" (1.753 m)         I personally evaluated and examined the patient in conjunction with the resident and agree with the assessment, treatment plan, and disposition of the patient as recorded by the resident. I performed a history and physical examination of the patient and discussed management with the resident. I reviewed the residents note and agree with the documented findings and plan of care. Any areas of disagreement are noted on the chart. I was personally present for the key portions of any procedures. I have documented in the chart those procedures where I was not present during the key portions. I have personally reviewed all images and agree with the resident's interpretation. I have reviewed the emergency nurses triage note.  I agree with the chief complaint, past medical history, past surgical history, allergies, medications, social and family history as documented unless otherwise noted.     John Rodríguez MD  Attending Emergency Physician            John Rodríguez MD  11/23/20 4730

## 2020-11-23 NOTE — ED PROVIDER NOTES
Food insecurity     Worry: Not on file     Inability: Not on file    Transportation needs     Medical: Not on file     Non-medical: Not on file   Tobacco Use    Smoking status: Former Smoker     Packs/day: 1.50     Years: 40.00     Pack years: 60.00    Smokeless tobacco: Never Used   Substance and Sexual Activity    Alcohol use: Yes     Comment: occasional    Drug use: No    Sexual activity: Yes     Partners: Female   Lifestyle    Physical activity     Days per week: Not on file     Minutes per session: Not on file    Stress: Not on file   Relationships    Social connections     Talks on phone: Not on file     Gets together: Not on file     Attends Synagogue service: Not on file     Active member of club or organization: Not on file     Attends meetings of clubs or organizations: Not on file     Relationship status: Not on file    Intimate partner violence     Fear of current or ex partner: Not on file     Emotionally abused: Not on file     Physically abused: Not on file     Forced sexual activity: Not on file   Other Topics Concern    Not on file   Social History Narrative    Not on file       History reviewed. No pertinent family history. Allergies:  Patient has no known allergies. Home Medications:  Prior to Admission medications    Medication Sig Start Date End Date Taking?  Authorizing Provider   aluminum & magnesium hydroxide-simethicone (MAALOX ADVANCED MAX ST) 400-400-40 MG/5ML SUSP Take 30 mLs by mouth every 6 hours as needed (Abdominal pain) 11/22/20 11/27/20 Yes Donavan Dakin, DO   dicyclomine (BENTYL) 10 MG capsule Take 1 capsule by mouth every 6 hours as needed (cramps, abdominal pain) 11/22/20  Yes Donavan Dakin, DO   tamsulosin (FLOMAX) 0.4 MG capsule Take 1 capsule by mouth daily Take one capsule daily to facilitate passage of ureteral stone 6/15/20  Yes Fransisco Ruelas MD   aspirin 81 MG tablet Take 81 mg by mouth daily   Yes Historical Provider, MD   sildenafil (VIAGRA) 100 MG orders placed or performed during the hospital encounter of 11/22/20   CBC Auto Differential   Result Value Ref Range    WBC 10.1 3.5 - 11.0 k/uL    RBC 6.30 (H) 4.5 - 5.9 m/uL    Hemoglobin 18.0 (H) 13.5 - 17.5 g/dL    Hematocrit 52.7 41 - 53 %    MCV 83.6 80 - 100 fL    MCH 28.6 26 - 34 pg    MCHC 34.2 31 - 37 g/dL    RDW 13.9 11.5 - 14.9 %    Platelets 960 610 - 147 k/uL    MPV 9.7 6.0 - 12.0 fL    NRBC Automated NOT REPORTED per 100 WBC    Differential Type NOT REPORTED     Seg Neutrophils 76 (H) 36 - 66 %    Lymphocytes 6 (L) 24 - 44 %    Monocytes 15 (H) 1 - 7 %    Eosinophils % 2 0 - 4 %    Basophils 1 0 - 2 %    Immature Granulocytes NOT REPORTED 0 %    Segs Absolute 7.80 1.3 - 9.1 k/uL    Absolute Lymph # 0.60 (L) 1.0 - 4.8 k/uL    Absolute Mono # 1.50 (H) 0.1 - 1.3 k/uL    Absolute Eos # 0.20 0.0 - 0.4 k/uL    Basophils Absolute 0.10 0.0 - 0.2 k/uL    Absolute Immature Granulocyte NOT REPORTED 0.00 - 0.30 k/uL    WBC Morphology NOT REPORTED     RBC Morphology NOT REPORTED     Platelet Estimate NOT REPORTED    Lactic Acid   Result Value Ref Range    Lactic Acid 0.8 0.5 - 2.2 mmol/L   Comprehensive Metabolic Panel   Result Value Ref Range    Glucose 103 (H) 70 - 99 mg/dL    BUN 13 6 - 20 mg/dL    CREATININE 0.96 0.70 - 1.20 mg/dL    Bun/Cre Ratio NOT REPORTED 9 - 20    Calcium 9.4 8.6 - 10.4 mg/dL    Sodium 134 (L) 135 - 144 mmol/L    Potassium 4.3 3.7 - 5.3 mmol/L    Chloride 99 98 - 107 mmol/L    CO2 25 20 - 31 mmol/L    Anion Gap 10 9 - 17 mmol/L    Alkaline Phosphatase 84 40 - 129 U/L    ALT 22 5 - 41 U/L    AST 19 <40 U/L    Total Bilirubin 0.41 0.3 - 1.2 mg/dL    Total Protein 7.7 6.4 - 8.3 g/dL    Alb 4.3 3.5 - 5.2 g/dL    Albumin/Globulin Ratio NOT REPORTED 1.0 - 2.5    GFR Non-African American >60 >60 mL/min    GFR African American >60 >60 mL/min    GFR Comment          GFR Staging NOT REPORTED    Lipase   Result Value Ref Range    Lipase 21 13 - 60 U/L   Magnesium   Result Value Ref Range Magnesium 2.2 1.6 - 2.6 mg/dL       IMPRESSION: 80-year-old male with significant past surgical history of hemicolectomy, appendectomy and cholecystectomy presenting with severe abdominal pain and associated diarrhea. Likely enteritis however cannot rule out acute surgical abnormality such as perforated hollow viscus, small bowel obstruction or mesenteric ischemia at this time. Plan for labs including CBC, CMP, magnesium, lactic acid. Will get CT abdomen/pelvis rule out acute surgical pathologies. RADIOLOGY:  Ct Abdomen Pelvis W Iv Contrast Additional Contrast? None    Result Date: 11/22/2020  EXAMINATION: CT OF THE ABDOMEN AND PELVIS WITH CONTRAST 11/22/2020 9:40 pm TECHNIQUE: CT of the abdomen and pelvis was performed with the administration of intravenous contrast. Multiplanar reformatted images are provided for review. Dose modulation, iterative reconstruction, and/or weight based adjustment of the mA/kV was utilized to reduce the radiation dose to as low as reasonably achievable. COMPARISON: 04/14/2015 HISTORY: ORDERING SYSTEM PROVIDED HISTORY: Sudden onset abdominal pain, hx of hemicolectomy TECHNOLOGIST PROVIDED HISTORY: Sudden onset abdominal pain, hx of hemicolectomy Reason for Exam: Sudden onset abdominal pain, hx of hemicolectomy; pt c/o abdominal pain since yesterday, h/o diverticulitis Acuity: Acute Type of Exam: Initial FINDINGS: Lower Chest: There is a 6 mm noncalcified nodule in the lateral right lower lobe, new from 04/14/2015. Lung bases are otherwise clear. Heart size is normal. Organs: The gallbladder is surgically absent. The liver, spleen, pancreas, adrenal glands and kidneys are normal. GI/Bowel: Status post partial sigmoid resection with end to side anastomosis. There are air-fluid levels within the normal caliber right, transverse and descending colon. There is mild mural thickening involving small bowel loops in the mid and right lower abdomen.   There is no evidence of bowel obstruction. The appendix is surgically absent. There is a sigmoid colon diverticulum filled with high attenuation debris. No evidence of diverticulitis. Pelvis: Moderate prostatomegaly. There are prostate gland calcifications. Urinary bladder is not well distended but grossly normal. Peritoneum/Retroperitoneum: There is a right lower quadrant 1.6 cm calcification. There is no adenopathy, free air or free fluid. There is a retroaortic left renal vein. Bones/Soft Tissues: There is no acute bone finding. There are air-fluid levels within the colon consistent with an acute nonspecific diarrheal disease. Mild small bowel mural thickening suspected and findings may represent an enterocolitis. Status post partial sigmoid resection with end to side anastomosis. There is a single residual sigmoid diverticulum. 6 mm noncalcified nodule in the lateral right lower lobe, new from 04/14/2015. Follow-up recommendation as below. RECOMMENDATIONS: Guidelines for follow-up and management of pulmonary nodules found on abdomen CT: <6 mm - No follow up recommend on the basis of the estimated low risk of malignancy. 6-8-mm - recommend follow-up chest CT after an appropriate interval (3-12 months depending on clinical risk). >8mm - immediate chest CT for further evaluation. Radiology 2017 http://pubs. rsna.org/doi/full/10.1148/radiol. 6787794330       EMERGENCY DEPARTMENT COURSE:  Evaluate patient bedside, tenderness palpation of abdomen diffusely, especially at the periumbilical region. Evidence of enterocolitis on CT scan, no acute surgical abnormalities. Patient treated with IV narcotics, fluids in emergency department. Patient states he feels somewhat improved on reevaluation but pain is now completely gone. Treated with p.o. Maalox and Bentyl. Outpatient prescriptions for Maalox, Bentyl. Given strict ED return cautions and follow-up directions.   He verbalized understanding and agreement with discharge plan    PROCEDURES:  None    CONSULTS:  None    CRITICAL CARE:  Please see attending note    FINAL IMPRESSION      1.  Enterocolitis          DISPOSITION / PLAN     DISPOSITION Decision To Discharge 11/22/2020 11:07:37 PM      PATIENT REFERRED TO:  SILAS MOREL [facility]  [address]  [address] [address]  Schedule an appointment as soon as possible for a visit in 1 week  For re-evaluation is symptoms are not resolved      DISCHARGE MEDICATIONS:  Discharge Medication List as of 11/22/2020 10:55 PM      START taking these medications    Details   aluminum & magnesium hydroxide-simethicone (MAALOX ADVANCED MAX ST) 400-400-40 MG/5ML SUSP Take 30 mLs by mouth every 6 hours as needed (Abdominal pain), Disp-1 Bottle,R-0Print      dicyclomine (BENTYL) 10 MG capsule Take 1 capsule by mouth every 6 hours as needed (cramps, abdominal pain), Disp-20 capsule,R-0Print             Morris Hernandez DO  Emergency Medicine Resident    (Please note that portions of thisnote were completed with a voice recognition program.  Efforts were made to edit the dictations but occasionally words are mis-transcribed.)        Morris Hernandez DO  Resident  11/23/20 5042

## 2020-11-23 NOTE — ED NOTES
Mode of arrival (squad #, walk in, police, etc) : walk in        Chief complaint(s): abdominal pains        Arrival Note (brief scenario, treatment PTA, etc). : *Pt having abdominal pains, pt denies cough, SOB, fevers, chills, headache, or any other covid symptom- pt does disclose he has a chronic cough from smoking but it has not been any worse- pt denies known exposure to covid*   Pt arrives to the ED with the complaint of abdominal pain. Pt states this pain feels like a pain he had before when he had diverticulitis and needed to having a bowel resection from perforated colon. Pt denies the pain as a 10 and sharp with any movement, but if he is still he states the pain is a 2 or 3.         C= \"Have you ever felt that you should Cut down on your drinking? \"  No  A= \"Have people Annoyed you by criticizing your drinking? \"  No  G= \"Have you ever felt bad or Guilty about your drinking? \"  No  E= \"Have you ever had a drink as an Eye-opener first thing in the morning to steady your nerves or to help a hangover? \"  No      Deferred []      Reason for deferring: N/A    *If yes to two or more: probable alcohol abuse. Jay Jaffe RN  11/22/20 0362

## 2020-12-21 ENCOUNTER — OFFICE VISIT (OUTPATIENT)
Dept: UROLOGY | Age: 55
End: 2020-12-21
Payer: COMMERCIAL

## 2020-12-21 VITALS
DIASTOLIC BLOOD PRESSURE: 80 MMHG | BODY MASS INDEX: 34.07 KG/M2 | SYSTOLIC BLOOD PRESSURE: 100 MMHG | HEART RATE: 76 BPM | TEMPERATURE: 98.2 F | WEIGHT: 230 LBS | RESPIRATION RATE: 18 BRPM | HEIGHT: 69 IN

## 2020-12-21 PROCEDURE — 99214 OFFICE O/P EST MOD 30 MIN: CPT | Performed by: UROLOGY

## 2020-12-21 ASSESSMENT — ENCOUNTER SYMPTOMS
COUGH: 0
SHORTNESS OF BREATH: 0
ABDOMINAL PAIN: 0
EYE PAIN: 0
CONSTIPATION: 0
VOMITING: 0
BACK PAIN: 0
NAUSEA: 0
DIARRHEA: 0
WHEEZING: 0

## 2020-12-21 NOTE — PROGRESS NOTES
[address] [address]  Dept: [address] [facility] Urology Office Note - Established    Patient:  Sigrid Corbett  YOB: 1965  Date: 12/21/2020    The patient is a 54 y.o. male who presents todayfor evaluation of the following problems:   Chief Complaint   Patient presents with    6 Month Follow-Up     PSA CARE EVERYWHERE       HPI  This is a very pleasant 51-year-old gentleman with a history of PSA elevation. He did have a prostate biopsy about 6 months ago which was benign. It did demonstrate chronic prostatitis. His repeat PSA is up to 5.96. Summary of old records: N/A    Additional History: N/A    Procedures Today: N/A    Urinalysis today:  No results found for this visit on 12/21/20.   Last several PSA's:  Lab Results   Component Value Date    PSA 4.68 (H) 03/30/2020     Last total testosterone:  No results found for: TESTOSTERONE    AUA Symptom Score (12/21/2020):  INCOMPLETE EMPTYING: How often have you had the sensation of not emptying your bladder?: Not at all  FREQUENCY: How often do you have to urinate less than every two hours?: Not at all  INTERMITTENCY: How often have you found you stopped and started again several times when you urinated?: Not at all  URGENCY: How often have you found it difficult to postpone urination?: Not at all  WEAK STREAM: How often have you had a weak urinary stream?: Less than 1 to 5 times  STRAINING: How often have you had to strain to start  urination?: Not at all  NOCTURIA: How many times did you typically get up at night to uriniate?: 1 Time  TOTAL I-PSS SCORE[de-identified] 2  How would you feel if you were to spend the rest of your life with your urinary condition?: Mixe    Last BUN and creatinine:  Lab Results   Component Value Date    BUN 13 11/22/2020     Lab Results   Component Value Date    CREATININE 0.96 11/22/2020       Additional Lab/Culture results: none Imaging Reviewed during this Office Visit: none  (results were independently reviewed by physician and radiology report verified)    PAST MEDICAL, FAMILY AND SOCIAL HISTORY UPDATE:  Past Medical History:   Diagnosis Date    Diverticulitis     Essential hypertension 3/4/2020    S/P appendectomy 3/4/2020    S/P cholecystectomy 3/4/2020    S/P left hemicolectomy 3/4/2020    Tobacco abuse      Past Surgical History:   Procedure Laterality Date    ANTERIOR CRUCIATE LIGAMENT REPAIR Left     APPENDECTOMY      CHOLECYSTECTOMY      COLECTOMY      colostomy    COLON SURGERY      colostomy reversal    COLONOSCOPY      multiple    COLONOSCOPY  5/27/15     No family history on file. Outpatient Medications Marked as Taking for the 12/21/20 encounter (Office Visit) with Debi Talavera MD   Medication Sig Dispense Refill    dicyclomine (BENTYL) 10 MG capsule Take 1 capsule by mouth every 6 hours as needed (cramps, abdominal pain) 20 capsule 0    tamsulosin (FLOMAX) 0.4 MG capsule Take 1 capsule by mouth daily Take one capsule daily to facilitate passage of ureteral stone 30 capsule 5    aspirin 81 MG tablet Take 81 mg by mouth daily      sildenafil (VIAGRA) 100 MG tablet Take 1 tablet by mouth daily as needed for Erectile Dysfunction 30 tablet 1    triamcinolone (KENALOG) 0.1 % cream Apply topically 2 times daily Apply topically 2 times daily. 30 g 0    traMADol (ULTRAM) 50 MG tablet Take 50 mg by mouth every 6 hours as needed for Pain.  diphenhydrAMINE (BENADRYL ALLERGY) 25 MG tablet Take 25 mg by mouth every 6 hours as needed for Itching         Patient has no known allergies.   Social History     Tobacco Use   Smoking Status Former Smoker    Packs/day: 1.50    Years: 40.00    Pack years: 60.00   Smokeless Tobacco Never Used     (Ifpatient a smoker, smoking cessation counseling offered)    Social History     Substance and Sexual Activity   Alcohol Use Yes    Comment: occasional REVIEW OF SYSTEMS:  Review of Systems    Physical Exam:      Vitals:    12/21/20 0911   BP: 100/80   Pulse: 76   Resp: 18   Temp: 98.2 °F (36.8 °C)     Body mass index is 33.97 kg/m². Patient is a 54 y.o. male in no acute distress and alert and oriented to person, place and time. Physical Exam  Constitutional: Patient in no acute distress. Neuro: Alert and oriented to person, place and time. Psych: Mood normal, affect normal  Skin: No rash noted  HEENT: Head: Normocephalic andatraumatic  Conjunctivae and EOM are normal. Pupils are equal, round  Nose:Normal  Right External Ear: Normal; Left External Ear: Normal  Mouth: Mucosa Moist  Neck: Supple  Lungs: Respiratory effort is normal  Cardiovascular: Warm & Pink  Abdomen: Soft, non-tender, non-distended with no CVA,  No flank tenderness,  Or hepatosplenomegaly   Lymphatics: No palpablelymphadenopathy. Bladder non-tender and not distended. Assessment and Plan      1. Elevated PSA    2. Other prostatic inflammatory diseases           Plan:   Prostate mri  exodx epi  Will f/u after above to decided how to manage persistent psa elevation  Pt's prostate bx showed chronic prostatitis. Return in about 4 weeks (around 1/18/2021) for prostate mri and exodx epi. Prescriptions Ordered:  No orders of the defined types were placed in this encounter. Orders Placed:  Orders Placed This Encounter   Procedures    MRI PROSTATE W WO CONTRAST     Standing Status:   Future     Standing Expiration Date:   12/21/2021     Order Specific Question:   Reason for exam:     Answer:   persistent psa elevatation, previous prostate bx           Rafa Mckinney MD    Agree with the ROS entered by the MA.

## 2020-12-21 NOTE — PROGRESS NOTES
Review of Systems   Constitutional: Negative for appetite change, chills, fatigue and fever. Eyes: Negative for pain and visual disturbance. Respiratory: Negative for cough, shortness of breath and wheezing. Cardiovascular: Negative for chest pain and leg swelling. Gastrointestinal: Negative for abdominal pain, constipation, diarrhea, nausea and vomiting. Genitourinary: Negative for difficulty urinating, dysuria, frequency, hematuria, penile pain and testicular pain. Musculoskeletal: Negative for back pain and myalgias. Neurological: Negative for dizziness, tremors, weakness, light-headedness, numbness and headaches. Hematological: Negative for adenopathy. Does not bruise/bleed easily.

## 2020-12-30 ENCOUNTER — HOSPITAL ENCOUNTER (OUTPATIENT)
Dept: MRI IMAGING | Age: 55
Discharge: HOME | End: 2021-01-01
Payer: COMMERCIAL

## 2020-12-30 DIAGNOSIS — R97.20 ELEVATED PSA: ICD-10-CM

## 2020-12-30 PROCEDURE — 6360000004 HC RX CONTRAST MEDICATION: Performed by: UROLOGY

## 2020-12-30 PROCEDURE — 72197 MRI PELVIS W/O & W/DYE: CPT

## 2020-12-30 PROCEDURE — A9579 GAD-BASE MR CONTRAST NOS,1ML: HCPCS | Performed by: UROLOGY

## 2020-12-30 PROCEDURE — 2580000003 HC RX 258: Performed by: UROLOGY

## 2020-12-30 RX ORDER — SODIUM CHLORIDE 0.9 % (FLUSH) 0.9 %
10 SYRINGE (ML) INJECTION PRN
Status: DISCONTINUED | OUTPATIENT
Start: 2020-12-30 | End: 2021-01-02 | Stop reason: HOSPADM

## 2020-12-30 RX ORDER — 0.9 % SODIUM CHLORIDE 0.9 %
50 INTRAVENOUS SOLUTION INTRAVENOUS ONCE
Status: COMPLETED | OUTPATIENT
Start: 2020-12-30 | End: 2020-12-30

## 2020-12-30 RX ADMIN — SODIUM CHLORIDE 50 ML: 9 INJECTION, SOLUTION INTRAVENOUS at 20:43

## 2020-12-30 RX ADMIN — Medication 10 ML: at 20:43

## 2020-12-30 RX ADMIN — GADOTERIDOL 20 ML: 279.3 INJECTION, SOLUTION INTRAVENOUS at 20:43

## 2021-01-11 ENCOUNTER — OFFICE VISIT (OUTPATIENT)
Dept: UROLOGY | Age: 56
End: 2021-01-11
Payer: COMMERCIAL

## 2021-01-11 VITALS — DIASTOLIC BLOOD PRESSURE: 92 MMHG | SYSTOLIC BLOOD PRESSURE: 141 MMHG | HEART RATE: 69 BPM | TEMPERATURE: 97.9 F

## 2021-01-11 DIAGNOSIS — R97.20 ELEVATED PSA: Primary | ICD-10-CM

## 2021-01-11 PROCEDURE — 99214 OFFICE O/P EST MOD 30 MIN: CPT | Performed by: UROLOGY

## 2021-01-11 RX ORDER — CIPROFLOXACIN 500 MG/1
500 TABLET, FILM COATED ORAL 2 TIMES DAILY
Qty: 6 TABLET | Refills: 0 | Status: SHIPPED | OUTPATIENT
Start: 2021-01-11

## 2021-01-11 ASSESSMENT — ENCOUNTER SYMPTOMS
EYE PAIN: 0
NAUSEA: 0
ABDOMINAL PAIN: 0
WHEEZING: 0
SHORTNESS OF BREATH: 0
COLOR CHANGE: 0
COUGH: 0
BACK PAIN: 0
VOMITING: 0
EYE REDNESS: 0

## 2021-01-11 NOTE — PROGRESS NOTES
[address] [address]  Dept: [address] [facility] Urology Office Note - Established    Patient:  Ang Diamond  YOB: 1965  Date: 1/11/2021    The patient is a 54 y.o. male who presents todayfor evaluation of the following problems:   Chief Complaint   Patient presents with    Elevated PSA     MRI  exo dx denied by insurance        HPI  Pt has h/o elevated psa; negative previous bx. Had prostate mri with pi rads 3 lesion. Summary of old records: N/A    Additional History: N/A    Procedures Today: N/A    Urinalysis today:  No results found for this visit on 01/11/21. Last several PSA's:  Lab Results   Component Value Date    PSA 4.68 (H) 03/30/2020     Last total testosterone:  No results found for: TESTOSTERONE    AUA Symptom Score (1/11/2021):   INCOMPLETE EMPTYING: How often have you had the sensation of not emptying your bladder?: Not at all  FREQUENCY: How often do you have to urinate less than every two hours?: Not at all  INTERMITTENCY: How often have you found you stopped and started again several times when you urinated?: Not at all  URGENCY: How often have you found it difficult to postpone urination?: Not at all  WEAK STREAM: How often have you had a weak urinary stream?: Less than 1 to 5 times  STRAINING: How often have you had to strain to start  urination?: Not at all  NOCTURIA: How many times did you typically get up at night to uriniate?: NONE  TOTAL I-PSS SCORE[de-identified] 1  How would you feel if you were to spend the rest of your life with your urinary condition?: Pleased    Last BUN and creatinine:  Lab Results   Component Value Date    BUN 13 11/22/2020     Lab Results   Component Value Date    CREATININE 0.96 11/22/2020       Additional Lab/Culture results: none    Imaging Reviewed during this Office Visit: mri as above (results were independently reviewed by physician and radiology report verified)    PAST MEDICAL, FAMILY AND SOCIAL HISTORY UPDATE:  Past Medical History:   Diagnosis Date    Diverticulitis     Essential hypertension 3/4/2020    S/P appendectomy 3/4/2020    S/P cholecystectomy 3/4/2020    S/P left hemicolectomy 3/4/2020    Tobacco abuse      Past Surgical History:   Procedure Laterality Date    ANTERIOR CRUCIATE LIGAMENT REPAIR Left     APPENDECTOMY      CHOLECYSTECTOMY      COLECTOMY      colostomy    COLON SURGERY      colostomy reversal    COLONOSCOPY      multiple    COLONOSCOPY  5/27/15     History reviewed. No pertinent family history. Outpatient Medications Marked as Taking for the 1/11/21 encounter (Office Visit) with Javad Kearney MD   Medication Sig Dispense Refill    traMADol (ULTRAM) 50 MG tablet Take 50 mg by mouth every 6 hours as needed for Pain. Patient has no known allergies. Social History     Tobacco Use   Smoking Status Current Every Day Smoker    Packs/day: 1.50    Years: 40.00    Pack years: 60.00    Types: Cigarettes   Smokeless Tobacco Never Used     (Ifpatient a smoker, smoking cessation counseling offered)    Social History     Substance and Sexual Activity   Alcohol Use Yes    Comment: occasional       REVIEW OF SYSTEMS:  Review of Systems    Physical Exam:      Vitals:    01/11/21 0857   BP: (!) 141/92   Pulse: 69   Temp: 97.9 °F (36.6 °C)     There is no height or weight on file to calculate BMI. Patient is a 54 y.o. male in no acute distress and alert and oriented to person, place and time. Physical Exam  Constitutional: Patient in no acute distress. Neuro: Alert and oriented to person, place and time.   Psych: Mood normal, affect normal  Skin: No rash noted  HEENT: Head: Normocephalic andatraumatic  Conjunctivae and EOM are normal. Pupils are equal, round  Nose:Normal  Right External Ear: Normal; Left External Ear: Normal  Mouth: Mucosa Moist Neck: Supple  Lungs: Respiratory effort is normal  Cardiovascular: Warm & [place]      Assessment and Plan      1. Elevated PSA           Plan:   Spent 25 min discussing mri results   Fusion prostate bx MAC at [facility]      Return for Fusion prostate bx MAC at [facility]. Prescriptions Ordered:  No orders of the defined types were placed in this encounter. Orders Placed:  No orders of the defined types were placed in this encounter. Lyndsay Mclean MD    Agree with the ROS entered by the MA.

## 2021-01-15 ENCOUNTER — TELEPHONE (OUTPATIENT)
Dept: UROLOGY | Age: 56
End: 2021-01-15

## 2021-01-23 ENCOUNTER — HOSPITAL ENCOUNTER (OUTPATIENT)
Dept: LAB | Age: 56
Setting detail: SPECIMEN
Discharge: HOME | End: 2021-01-23
Payer: COMMERCIAL

## 2021-01-23 DIAGNOSIS — Z20.822 COVID-19 RULED OUT BY LABORATORY TESTING: Primary | ICD-10-CM

## 2021-01-23 PROCEDURE — U0003 INFECTIOUS AGENT DETECTION BY NUCLEIC ACID (DNA OR RNA); SEVERE ACUTE RESPIRATORY SYNDROME CORONAVIRUS 2 (SARS-COV-2) (CORONAVIRUS DISEASE [COVID-19]), AMPLIFIED PROBE TECHNIQUE, MAKING USE OF HIGH THROUGHPUT TECHNOLOGIES AS DESCRIBED BY CMS-2020-01-R: HCPCS

## 2021-01-23 PROCEDURE — U0005 INFEC AGEN DETEC AMPLI PROBE: HCPCS

## 2021-01-24 LAB
SARS-COV-2, RAPID: NORMAL
SARS-COV-2: NORMAL
SARS-COV-2: NOT DETECTED
SOURCE: NORMAL

## 2021-01-27 ENCOUNTER — HOSPITAL ENCOUNTER (OUTPATIENT)
Dept: ULTRASOUND IMAGING | Age: 56
Setting detail: OUTPATIENT SURGERY
Discharge: HOME | End: 2021-01-29
Attending: UROLOGY
Payer: COMMERCIAL

## 2021-01-27 ENCOUNTER — ANESTHESIA (OUTPATIENT)
Dept: OPERATING ROOM | Age: 56
End: 2021-01-27
Payer: COMMERCIAL

## 2021-01-27 ENCOUNTER — HOSPITAL ENCOUNTER (OUTPATIENT)
Age: 56
Setting detail: OUTPATIENT SURGERY
Discharge: HOME | End: 2021-01-27
Attending: UROLOGY | Admitting: UROLOGY
Payer: COMMERCIAL

## 2021-01-27 ENCOUNTER — ANESTHESIA EVENT (OUTPATIENT)
Dept: OPERATING ROOM | Age: 56
End: 2021-01-27
Payer: COMMERCIAL

## 2021-01-27 VITALS
OXYGEN SATURATION: 94 % | DIASTOLIC BLOOD PRESSURE: 81 MMHG | RESPIRATION RATE: 20 BRPM | HEART RATE: 67 BPM | TEMPERATURE: 97.7 F | SYSTOLIC BLOOD PRESSURE: 122 MMHG | HEIGHT: 70 IN | BODY MASS INDEX: 32.11 KG/M2 | WEIGHT: 224.31 LBS

## 2021-01-27 VITALS — OXYGEN SATURATION: 95 % | SYSTOLIC BLOOD PRESSURE: 101 MMHG | DIASTOLIC BLOOD PRESSURE: 50 MMHG

## 2021-01-27 PROCEDURE — 2500000003 HC RX 250 WO HCPCS: Performed by: NURSE ANESTHETIST, CERTIFIED REGISTERED

## 2021-01-27 PROCEDURE — 88344 IMHCHEM/IMCYTCHM EA MLT ANTB: CPT

## 2021-01-27 PROCEDURE — 3600000002 HC SURGERY LEVEL 2 BASE: Performed by: UROLOGY

## 2021-01-27 PROCEDURE — 2500000003 HC RX 250 WO HCPCS: Performed by: UROLOGY

## 2021-01-27 PROCEDURE — 88305 TISSUE EXAM BY PATHOLOGIST: CPT

## 2021-01-27 PROCEDURE — 7100000041 HC SPAR PHASE II RECOVERY - ADDTL 15 MIN: Performed by: UROLOGY

## 2021-01-27 PROCEDURE — 6360000002 HC RX W HCPCS: Performed by: NURSE ANESTHETIST, CERTIFIED REGISTERED

## 2021-01-27 PROCEDURE — 3700000000 HC ANESTHESIA ATTENDED CARE: Performed by: UROLOGY

## 2021-01-27 PROCEDURE — 3700000001 HC ADD 15 MINUTES (ANESTHESIA): Performed by: UROLOGY

## 2021-01-27 PROCEDURE — 2580000003 HC RX 258: Performed by: NURSE ANESTHETIST, CERTIFIED REGISTERED

## 2021-01-27 PROCEDURE — 3600000012 HC SURGERY LEVEL 2 ADDTL 15MIN: Performed by: UROLOGY

## 2021-01-27 PROCEDURE — 7100000040 HC SPAR PHASE II RECOVERY - FIRST 15 MIN: Performed by: UROLOGY

## 2021-01-27 PROCEDURE — 2709999900 HC NON-CHARGEABLE SUPPLY: Performed by: UROLOGY

## 2021-01-27 PROCEDURE — 2709999900 US GUIDED NEEDLE PLACEMENT

## 2021-01-27 RX ORDER — SODIUM CHLORIDE, SODIUM LACTATE, POTASSIUM CHLORIDE, CALCIUM CHLORIDE 600; 310; 30; 20 MG/100ML; MG/100ML; MG/100ML; MG/100ML
INJECTION, SOLUTION INTRAVENOUS CONTINUOUS PRN
Status: DISCONTINUED | OUTPATIENT
Start: 2021-01-27 | End: 2021-01-27 | Stop reason: SDUPTHER

## 2021-01-27 RX ORDER — CEFAZOLIN SODIUM 2 G/50ML
SOLUTION INTRAVENOUS PRN
Status: DISCONTINUED | OUTPATIENT
Start: 2021-01-27 | End: 2021-01-27 | Stop reason: SDUPTHER

## 2021-01-27 RX ORDER — FENTANYL CITRATE 50 UG/ML
25 INJECTION, SOLUTION INTRAMUSCULAR; INTRAVENOUS EVERY 5 MIN PRN
Status: CANCELLED | OUTPATIENT
Start: 2021-01-27

## 2021-01-27 RX ORDER — MEPERIDINE HYDROCHLORIDE 50 MG/ML
12.5 INJECTION INTRAMUSCULAR; INTRAVENOUS; SUBCUTANEOUS EVERY 5 MIN PRN
Status: DISCONTINUED | OUTPATIENT
Start: 2021-01-27 | End: 2021-01-27 | Stop reason: HOSPADM

## 2021-01-27 RX ORDER — LIDOCAINE HYDROCHLORIDE 10 MG/ML
1 INJECTION, SOLUTION EPIDURAL; INFILTRATION; INTRACAUDAL; PERINEURAL
Status: CANCELLED | OUTPATIENT
Start: 2021-01-27 | End: 2021-01-27

## 2021-01-27 RX ORDER — SODIUM CHLORIDE, SODIUM LACTATE, POTASSIUM CHLORIDE, CALCIUM CHLORIDE 600; 310; 30; 20 MG/100ML; MG/100ML; MG/100ML; MG/100ML
INJECTION, SOLUTION INTRAVENOUS CONTINUOUS
Status: CANCELLED | OUTPATIENT
Start: 2021-01-27

## 2021-01-27 RX ORDER — PROPOFOL 10 MG/ML
INJECTION, EMULSION INTRAVENOUS PRN
Status: DISCONTINUED | OUTPATIENT
Start: 2021-01-27 | End: 2021-01-27 | Stop reason: SDUPTHER

## 2021-01-27 RX ORDER — FENTANYL CITRATE 50 UG/ML
25 INJECTION, SOLUTION INTRAMUSCULAR; INTRAVENOUS EVERY 5 MIN PRN
Status: DISCONTINUED | OUTPATIENT
Start: 2021-01-27 | End: 2021-01-27 | Stop reason: HOSPADM

## 2021-01-27 RX ORDER — SODIUM CHLORIDE 0.9 % (FLUSH) 0.9 %
10 SYRINGE (ML) INJECTION PRN
Status: CANCELLED | OUTPATIENT
Start: 2021-01-27

## 2021-01-27 RX ORDER — MIDAZOLAM HYDROCHLORIDE 2 MG/2ML
1 INJECTION, SOLUTION INTRAMUSCULAR; INTRAVENOUS EVERY 10 MIN PRN
Status: CANCELLED | OUTPATIENT
Start: 2021-01-27

## 2021-01-27 RX ORDER — LIDOCAINE HYDROCHLORIDE 10 MG/ML
INJECTION, SOLUTION INFILTRATION; PERINEURAL PRN
Status: DISCONTINUED | OUTPATIENT
Start: 2021-01-27 | End: 2021-01-27 | Stop reason: ALTCHOICE

## 2021-01-27 RX ORDER — FENTANYL CITRATE 50 UG/ML
INJECTION, SOLUTION INTRAMUSCULAR; INTRAVENOUS PRN
Status: DISCONTINUED | OUTPATIENT
Start: 2021-01-27 | End: 2021-01-27 | Stop reason: SDUPTHER

## 2021-01-27 RX ORDER — LIDOCAINE HYDROCHLORIDE 10 MG/ML
INJECTION, SOLUTION EPIDURAL; INFILTRATION; INTRACAUDAL; PERINEURAL PRN
Status: DISCONTINUED | OUTPATIENT
Start: 2021-01-27 | End: 2021-01-27 | Stop reason: SDUPTHER

## 2021-01-27 RX ORDER — SODIUM CHLORIDE 0.9 % (FLUSH) 0.9 %
10 SYRINGE (ML) INJECTION EVERY 12 HOURS SCHEDULED
Status: CANCELLED | OUTPATIENT
Start: 2021-01-27

## 2021-01-27 RX ORDER — PROPOFOL 10 MG/ML
INJECTION, EMULSION INTRAVENOUS CONTINUOUS PRN
Status: DISCONTINUED | OUTPATIENT
Start: 2021-01-27 | End: 2021-01-27 | Stop reason: SDUPTHER

## 2021-01-27 RX ADMIN — LIDOCAINE HYDROCHLORIDE 50 MG: 10 INJECTION, SOLUTION EPIDURAL; INFILTRATION; INTRACAUDAL; PERINEURAL at 11:50

## 2021-01-27 RX ADMIN — FENTANYL CITRATE 50 MCG: 50 INJECTION, SOLUTION INTRAMUSCULAR; INTRAVENOUS at 11:49

## 2021-01-27 RX ADMIN — SODIUM CHLORIDE, POTASSIUM CHLORIDE, SODIUM LACTATE AND CALCIUM CHLORIDE: 600; 310; 30; 20 INJECTION, SOLUTION INTRAVENOUS at 11:36

## 2021-01-27 RX ADMIN — PROPOFOL 120 MG: 10 INJECTION, EMULSION INTRAVENOUS at 11:50

## 2021-01-27 RX ADMIN — FENTANYL CITRATE 50 MCG: 50 INJECTION, SOLUTION INTRAMUSCULAR; INTRAVENOUS at 11:46

## 2021-01-27 RX ADMIN — PROPOFOL 120 MCG/KG/MIN: 10 INJECTION, EMULSION INTRAVENOUS at 11:50

## 2021-01-27 RX ADMIN — CEFAZOLIN SODIUM 2 G: 2 SOLUTION INTRAVENOUS at 11:41

## 2021-01-27 ASSESSMENT — PAIN SCALES - GENERAL
PAINLEVEL_OUTOF10: 0
PAINLEVEL_OUTOF10: 0

## 2021-01-27 ASSESSMENT — PULMONARY FUNCTION TESTS
PIF_VALUE: 1

## 2021-01-27 ASSESSMENT — PAIN - FUNCTIONAL ASSESSMENT: PAIN_FUNCTIONAL_ASSESSMENT: FLACC

## 2021-01-27 NOTE — ANESTHESIA POSTPROCEDURE EVALUATION
Department of Anesthesiology  Postprocedure Note    Patient: Herlinda Rater  MRN: [id number]  YOB: 1965  Date of evaluation: 1/27/2021  Time:  1:46 PM     Procedure Summary     Date: 01/27/21 Room / Location: [address]    Anesthesia Start: 5957 Anesthesia Stop: 5464    Procedure: FUSION PROSTATE BIOPSY, OFFICE NOTIFYING ULTRASOUND (N/A ) Diagnosis: (ELEVATED PSA)    Surgeons: Roque Becker MD Responsible Provider: Lance Leblanc MD    Anesthesia Type: TIVA, General ASA Status: 2          Anesthesia Type: TIVA, General    Charlene Phase I: Charlene Score: 10    Charlene Phase II: Charlene Score: 10    Last vitals: Reviewed and per EMR flowsheets.        Anesthesia Post Evaluation    Patient location during evaluation: PACU  Patient participation: complete - patient participated  Level of consciousness: awake and alert  Pain score: 0  Airway patency: patent  Nausea & Vomiting: no nausea and no vomiting  Complications: no  Cardiovascular status: hemodynamically stable  Respiratory status: room air  Hydration status: euvolemic

## 2021-01-27 NOTE — ANESTHESIA PRE PROCEDURE
Department of Anesthesiology  Preprocedure Note       Name:  Aleks Rivers   Age:  54 y.o.  :  1965                                          MRN:  [id number]         Date:  2021      Surgeon: Sonia Ko):  Merline Panning, MD    Procedure: Procedure(s):  FUSION PROSTATE BIOPSY, OFFICE NOTIFYING ULTRASOUND    Medications prior to admission:   Prior to Admission medications    Medication Sig Start Date End Date Taking? Authorizing Provider   ciprofloxacin (CIPRO) 500 MG tablet Take 1 tablet by mouth 2 times daily Take first dose 1 hour prior to prostate biopsy and then twice a day thereafter until completed 21  Yes Merline Panning, MD   tamsulosin (FLOMAX) 0.4 MG capsule Take 1 capsule by mouth daily Take one capsule daily to facilitate passage of ureteral stone 6/15/20  Yes Merline Panning, MD   aspirin 81 MG tablet Take 81 mg by mouth daily   Yes Historical Provider, MD   traMADol (ULTRAM) 50 MG tablet Take 50 mg by mouth every 6 hours as needed for Pain. Yes Historical Provider, MD   dicyclomine (BENTYL) 10 MG capsule Take 1 capsule by mouth every 6 hours as needed (cramps, abdominal pain)  Patient not taking: Reported on 20   Lattiara Seen, DO   sildenafil (VIAGRA) 100 MG tablet Take 1 tablet by mouth daily as needed for Erectile Dysfunction  Patient not taking: Reported on 2021   Merline Panning, MD   triamcinolone (KENALOG) 0.1 % cream Apply topically 2 times daily Apply topically 2 times daily. Patient not taking: Reported on 2021 3/3/20   Leydi Nettles PA-C   diphenhydrAMINE (BENADRYL ALLERGY) 25 MG tablet Take 25 mg by mouth every 6 hours as needed for Itching    Historical Provider, MD       Current medications:    No current facility-administered medications for this encounter.         Allergies:  No Known Allergies    Problem List:    Patient Active Problem List   Diagnosis Code    Rash in adult R21    Essential hypertension I10 Component Value Date    WBC 10.1 11/22/2020    RBC 6.30 11/22/2020    RBC 5.68 02/13/2012    HGB 18.0 11/22/2020    HCT 52.7 11/22/2020    MCV 83.6 11/22/2020    RDW 13.9 11/22/2020     11/22/2020     02/13/2012       CMP:   Lab Results   Component Value Date     11/22/2020    K 4.3 11/22/2020    CL 99 11/22/2020    CO2 25 11/22/2020    BUN 13 11/22/2020    CREATININE 0.96 11/22/2020    GFRAA >60 11/22/2020    LABGLOM >60 11/22/2020    GLUCOSE 103 11/22/2020    PROT 7.7 11/22/2020    CALCIUM 9.4 11/22/2020    BILITOT 0.41 11/22/2020    ALKPHOS 84 11/22/2020    AST 19 11/22/2020    ALT 22 11/22/2020       POC Tests: No results for input(s): POCGLU, POCNA, POCK, POCCL, POCBUN, POCHEMO, POCHCT in the last 72 hours. Coags: No results found for: PROTIME, INR, APTT    HCG (If Applicable): No results found for: PREGTESTUR, PREGSERUM, HCG, HCGQUANT     ABGs: No results found for: PHART, PO2ART, PDE4XIX, RFO3YNT, BEART, G6TPWEGM     Type & Screen (If Applicable):  No results found for: LABABO, LABRH    Drug/Infectious Status (If Applicable):  No results found for: HIV, HEPCAB    COVID-19 Screening (If Applicable):   Lab Results   Component Value Date    COVID19 Not Detected 01/23/2021         Anesthesia Evaluation  Patient summary reviewed no history of anesthetic complications:   Airway: Mallampati: II  TM distance: >3 FB   Neck ROM: full  Mouth opening: > = 3 FB Dental:          Pulmonary:Negative Pulmonary ROS and normal exam                               Cardiovascular:    (+) hypertension: no interval change,       ECG reviewed  Rhythm: regular  Rate: normal                    Neuro/Psych:   Negative Neuro/Psych ROS              GI/Hepatic/Renal: Neg GI/Hepatic/Renal ROS            Endo/Other: Negative Endo/Other ROS                    Abdominal:           Vascular: negative vascular ROS. Anesthesia Plan      MAC     ASA 2       Induction: intravenous. Anesthetic plan and risks discussed with patient. Plan discussed with CRNA.                   Katharina Wang MD   1/27/2021

## 2021-01-27 NOTE — H&P
Pre-op History and Physical  [address] Peachamkathi Burrell PA-C    Patient:  Robin Nair  MRN: [id number]  YOB: 1965    HISTORY OF PRESENT ILLNESS:     The patient is a 54 y.o. male who presents with MRI prostate. has PIRADs 3 lesion. PSA was 4.68 and most recently 5.96. He had a negative biopsy in 2020 June 8 which was neg for cancer. He takes aspirin 81 mg daily. Patient's old records, notes and chart reviewed and summarized above. [address] Rola Burrell PA-C independently reviewed the images and verified the radiology reports from:    No results found. Past Medical History:    Past Medical History:   Diagnosis Date    Diverticulitis     Essential hypertension 3/4/2020    denies, no PCP    S/P appendectomy 3/4/2020    S/P cholecystectomy 3/4/2020    S/P left hemicolectomy 3/4/2020    Tobacco abuse        Past Surgical History:    Past Surgical History:   Procedure Laterality Date    ANTERIOR CRUCIATE LIGAMENT REPAIR Left     APPENDECTOMY      CHOLECYSTECTOMY      COLECTOMY      colostomy    COLON SURGERY      colostomy reversal    COLONOSCOPY      multiple    COLONOSCOPY  5/27/15       Medications Prior to Admission:    Prior to Admission medications    Medication Sig Start Date End Date Taking?  Authorizing Provider   ciprofloxacin (CIPRO) 500 MG tablet Take 1 tablet by mouth 2 times daily Take first dose 1 hour prior to prostate biopsy and then twice a day thereafter until completed 1/11/21   Yash Neff MD   dicyclomine (BENTYL) 10 MG capsule Take 1 capsule by mouth every 6 hours as needed (cramps, abdominal pain)  Patient not taking: Reported on 1/11/2021 11/22/20   Delaney Kapadia DO   tamsulosin (FLOMAX) 0.4 MG capsule Take 1 capsule by mouth daily Take one capsule daily to facilitate passage of ureteral stone  Patient not taking: Reported on 1/11/2021 6/15/20   Yash Neff MD   aspirin 81 MG tablet Take 81 mg by mouth daily    Historical Provider, MD   sildenafil (VIAGRA) 100 MG tablet Take 1 tablet by mouth daily as needed for Erectile Dysfunction  Patient not taking: Reported on 1/11/2021 4/6/20   Mathew Blackmon MD   triamcinolone (KENALOG) 0.1 % cream Apply topically 2 times daily Apply topically 2 times daily. Patient not taking: Reported on 1/11/2021 3/3/20   Suzan Jain PA-C   traMADol (ULTRAM) 50 MG tablet Take 50 mg by mouth every 6 hours as needed for Pain. Historical Provider, MD   diphenhydrAMINE (BENADRYL ALLERGY) 25 MG tablet Take 25 mg by mouth every 6 hours as needed for Itching    Historical Provider, MD       Allergies:  Patient has no known allergies.     Social History:    Social History     Socioeconomic History    Marital status:      Spouse name: Not on file    Number of children: Not on file    Years of education: Not on file    Highest education level: Not on file   Occupational History    Not on file   Social Needs    Financial resource strain: Not on file    Food insecurity     Worry: Not on file     Inability: Not on file    Transportation needs     Medical: Not on file     Non-medical: Not on file   Tobacco Use    Smoking status: Current Every Day Smoker     Packs/day: 1.00     Years: 40.00     Pack years: 40.00     Types: Cigarettes    Smokeless tobacco: Never Used   Substance and Sexual Activity    Alcohol use: Yes     Comment: rarely    Drug use: No    Sexual activity: Yes     Partners: Female   Lifestyle    Physical activity     Days per week: Not on file     Minutes per session: Not on file    Stress: Not on file   Relationships    Social connections     Talks on phone: Not on file     Gets together: Not on file     Attends Christianity service: Not on file     Active member of club or organization: Not on file     Attends meetings of clubs or organizations: Not on file     Relationship status: Not on file    Intimate partner violence     Fear of current or ex partner: Not on file     Emotionally abused: Not on file     Physically abused: Not on file     Forced sexual activity: Not on file   Other Topics Concern    Not on file   Social History Narrative    Not on file       Family History:  History reviewed. No pertinent family history. REVIEW OF SYSTEMS:  Constitutional: negative  Eyes: negative  Respiratory: negative  Cardiovascular: negative  Gastrointestinal: negative  Genitourinary: no acute issues  Musculoskeletal: negative  Skin: negative   Neurological: negative  Hematological/Lymphatic: negative  Psychological: negative    PHYSICAL EXAM:    No data found. Constitutional: Patient in NAD  Neuro: Alert and oriented to person, place, and time  Psych: Mood and affect normal  Skin: Clean, dry, intact   Lungs: Respiratory effort normal, CTA  Cardiovascular:  Normal peripheral pulses; no murmur. Normal rhythm  Abdomen: Soft, non-tender, non-distended, no hepatosplenomegaly or hernia, CVA tenderness none  Bladder: Non-tender and non-disdended   : Non-tender, skin intact, no lesions       LABS:   No results for input(s): WBC, HGB, HCT, MCV, PLT in the last 72 hours. No results for input(s): NA, K, CL, CO2, PHOS, BUN, CREATININE in the last 72 hours. Invalid input(s): CA  Lab Results   Component Value Date    PSA 4.68 (H) 03/30/2020         Urinalysis: No results for input(s): COLORU, PHUR, LABCAST, WBCUA, RBCUA, MUCUS, TRICHOMONAS, YEAST, BACTERIA, CLARITYU, SPECGRAV, LEUKOCYTESUR, UROBILINOGEN, Anise Bolus in the last 72 hours. Invalid input(s): NITRATE, GLUCOSEUKETONESUAMORPHOUS     -----------------------------------------------------------------    ASSESSMENT AND PLAN:    Impression:    Elevated PSA    Plan: Fusion prostate biopsy in OR today. Consent obtained    The patient was counseled at length about the risks of neil Covid-19 during their perioperative period and any recovery window from their procedure.   The patient was made aware that neil Covid-19  may worsen their prognosis for recovering from their procedure  and lend to a higher morbidity and/or mortality risk. All material risks, benefits, and reasonable alternatives including postponing the procedure were discussed. The patient does wish to proceed with the procedure at this time.     Clayton Mendiola PA-C  10:48 AM 1/27/2021

## 2021-01-28 NOTE — OP NOTE
Operative Note      Patient: Shu Chiang  YOB: 1965  MRN: [id number]    Date of Procedure: 1/27/2021    Pre-Op Diagnosis: ELEVATED PSA    Post-Op Diagnosis: {MH OR UHEB:[id number]}       Procedure(s):  FUSION PROSTATE BIOPSY, OFFICE NOTIFYING ULTRASOUND    Surgeon(s):  Mathew Blackmon MD    Assistant:   * No surgical staff found *    Anesthesia: Monitor Anesthesia Care    Estimated Blood Loss (mL): {NUMBERS; NNO:32286}    Complications: {Symptoms;  Intra-op complications:14795}    Specimens:   ID Type Source Tests Collected by Time Destination   A : LLB Tissue Prostate SURGICAL PATHOLOGY Mathew Blackmon MD 1/27/2021 1109    B : LB Tissue Prostate SURGICAL PATHOLOGY Mathew Blackmon MD 1/27/2021 1109    C : RB Tissue Prostate SURGICAL PATHOLOGY Mathew Blackmon MD 1/27/2021 1109    D : RLB Tissue Prostate SURGICAL PATHOLOGY Mathew Blackmon MD 1/27/2021 1109    E : LLM Tissue Prostate SURGICAL PATHOLOGY Mathew Blackmon MD 1/27/2021 1109    F : LM Tissue Prostate SURGICAL PATHOLOGY Mathew Blackmon MD 1/27/2021 1109    G : RM Tissue Prostate SURGICAL PATHOLOGY Mathew Blackmon MD 1/27/2021 1109    H : RLM Tissue Prostate SURGICAL PATHOLOGY Mathew Blackmon MD 1/27/2021 1109    I : LLA Tissue Prostate SURGICAL PATHOLOGY Mathew Blackmon MD 1/27/2021 1109    J : LA Tissue Prostate SURGICAL PATHOLOGY Mathew Blackmon MD 1/27/2021 1109    K : RA Tissue Prostate SURGICAL PATHOLOGY Mathew Blackmon MD 1/27/2021 1109    L : RLA Tissue Prostate SURGICAL PATHOLOGY Mathew Blackmon MD 1/27/2021 1109    M : TARGET LESION #1 Tissue Prostate SURGICAL PATHOLOGY Mathew Blackmon MD 1/27/2021 1229        Implants:  * No implants in log *      Drains: * No LDAs found *    Findings: ***    Detailed Description of Procedure:   ***    Electronically signed by Mathew Blackmon MD on 1/28/2021 at 9:15 AM

## 2021-01-28 NOTE — OP NOTE
Operative Note      Patient: Clarisa Antoine  YOB: 1965  MRN: [id number]    Date of Procedure: 1/27/2021    Pre-Op Diagnosis: ELEVATED PSA    Post-Op Diagnosis: Same       Procedure(s):  FUSION PROSTATE BIOPSY, OFFICE NOTIFYING ULTRASOUND    Surgeon(s):  Kylah Valdez MD    Assistant:   * No surgical staff found *    Anesthesia: Monitor Anesthesia Care    Estimated Blood Loss (mL): Minimal    Complications: None    Specimens:   ID Type Source Tests Collected by Time Destination   A : LLB Tissue Prostate SURGICAL PATHOLOGY Kylah Valdez MD 1/27/2021 1109    B : LB Tissue Prostate SURGICAL PATHOLOGY Kylah Valdez MD 1/27/2021 1109    C : RB Tissue Prostate SURGICAL PATHOLOGY Kylah Valdez MD 1/27/2021 1109    D : RLB Tissue Prostate SURGICAL PATHOLOGY Kylah Valdez MD 1/27/2021 1109    E : LLM Tissue Prostate [address] [place] [address], MD 1/27/2021 1109    F : LM Tissue Prostate [address] [place] [address], MD 1/27/2021 1109    G : RM Tissue Prostate SURGICAL PATHOLOGY Kylah Valdez MD 1/27/2021 1109    H : RLM Tissue Prostate SURGICAL PATHOLOGY Kylah Valdez MD 1/27/2021 1109    I : LLA Tissue Prostate SURGICAL PATHOLOGY Kylah Valdez MD 1/27/2021 1109    J : LA Tissue Prostate SURGICAL PATHOLOGY Kylah Valdez MD 1/27/2021 1109    K : RA Tissue Prostate SURGICAL PATHOLOGY Kylah Valdez MD 1/27/2021 1109    L : RLA Tissue Prostate SURGICAL PATHOLOGY Kylah Valdez MD 1/27/2021 1109    M : TARGET LESION #1 Tissue Prostate SURGICAL PATHOLOGY Kylah Valdez MD 1/27/2021 1229        Implants:  * No implants in log *      Drains: * No LDAs found *    Findings: Successful biopsy of target lesion and systematic 12 zone biopsy performed    Detailed Description of Procedure: The patient was brought to the operating room. He was prepped identified. He was administered a monitored anesthetic and placed in lateral decubitus position.   An ultrasound probe was placed into the rectum. The MRI images were fused real-time to the ultrasound. The target lesion was identified. I did take 3 needle cores through the target lesion. I then performed a 12 zone biopsy with 2 cores per his own. These zones included the base mid gland and apex at the left and right lateral and medial aspects. At this point the procedure was terminated. The patient tolerated the procedure well. There was visual confirmation of successful target lesion biopsy.     Electronically signed by Britton Vera MD on 1/28/2021 at 9:23 AM

## 2021-02-01 LAB — SURGICAL PATHOLOGY REPORT: NORMAL

## 2021-02-08 ENCOUNTER — OFFICE VISIT (OUTPATIENT)
Dept: UROLOGY | Age: 56
End: 2021-02-08
Payer: COMMERCIAL

## 2021-02-08 VITALS
BODY MASS INDEX: 32.07 KG/M2 | TEMPERATURE: 98 F | RESPIRATION RATE: 18 BRPM | WEIGHT: 224 LBS | SYSTOLIC BLOOD PRESSURE: 124 MMHG | HEART RATE: 76 BPM | DIASTOLIC BLOOD PRESSURE: 80 MMHG | HEIGHT: 70 IN

## 2021-02-08 DIAGNOSIS — R35.1 NOCTURIA: ICD-10-CM

## 2021-02-08 DIAGNOSIS — R31.0 GROSS HEMATURIA: ICD-10-CM

## 2021-02-08 DIAGNOSIS — R39.12 WEAK URINARY STREAM: ICD-10-CM

## 2021-02-08 DIAGNOSIS — R97.20 ELEVATED PSA: Primary | ICD-10-CM

## 2021-02-08 DIAGNOSIS — R35.0 FREQUENCY OF URINATION: ICD-10-CM

## 2021-02-08 PROCEDURE — 99214 OFFICE O/P EST MOD 30 MIN: CPT | Performed by: UROLOGY

## 2021-02-08 RX ORDER — TAMSULOSIN HYDROCHLORIDE 0.4 MG/1
0.4 CAPSULE ORAL DAILY
Qty: 90 CAPSULE | Refills: 3 | Status: SHIPPED | OUTPATIENT
Start: 2021-02-08 | End: 2022-02-14 | Stop reason: SDUPTHER

## 2021-02-08 ASSESSMENT — ENCOUNTER SYMPTOMS
VOMITING: 0
SHORTNESS OF BREATH: 0
ABDOMINAL PAIN: 0
BACK PAIN: 0
EYE PAIN: 0
NAUSEA: 0
DIARRHEA: 0
WHEEZING: 0
COUGH: 0
CONSTIPATION: 0

## 2021-02-08 NOTE — PROGRESS NOTES
[address] [address]  Dept: [address] [facility] Urology Office Note - Established    Patient:  Juan Ch  YOB: 1965  Date: 2/8/2021    The patient is a 54 y.o. male who presents todayfor evaluation of the following problems:   Chief Complaint   Patient presents with    Follow-up     S/P Prostste bx results       HPI  This is a very pleasant 59-year-old gentleman who has had a history of a persistently elevated PSA. He had an abnormal prostate MRI. His fusion biopsy results demonstrated benign findings. He did have gross hematuria after the biopsy. He denied any fevers or chills. Has noticed some worsening lower urinary tract symptoms over the last several months. His stream is gotten weaker and he has had more frequency of urination. Sometimes he also has urgency. He has taken Flomax for a short period of time and saw some improvement but then stopped taking it. He is very bothered by his symptoms. Summary of old records: N/A    Additional History: N/A    Procedures Today: N/A    Urinalysis today:  No results found for this visit on 02/08/21. Last several PSA's:  Lab Results   Component Value Date    PSA 4.68 (H) 03/30/2020     Last total testosterone:  No results found for: TESTOSTERONE    AUA Symptom Score (2/8/2021):   INCOMPLETE EMPTYING: How often have you had the sensation of not emptying your bladder?: Not at all  FREQUENCY: How often do you have to urinate less than every two hours?: Not at all  INTERMITTENCY: How often have you found you stopped and started again several times when you urinated?: Not at all  URGENCY: How often have you found it difficult to postpone urination?: Not at all  WEAK STREAM: How often have you had a weak urinary stream?: Not at all  STRAINING: How often have you had to strain to start  urination?: Not at all Patient has no known allergies. Social History     Tobacco Use   Smoking Status Current Every Day Smoker    Packs/day: 1.00    Years: 40.00    Pack years: 40.00    Types: Cigarettes   Smokeless Tobacco Never Used     (Ifpatient a smoker, smoking cessation counseling offered)    Social History     Substance and Sexual Activity   Alcohol Use Yes    Comment: rarely       REVIEW OF SYSTEMS:  Review of Systems    Physical Exam:      Vitals:    02/08/21 1156   BP: 124/80   Pulse: 76   Resp: 18   Temp: 98 °F (36.7 °C)     Body mass index is 32.14 kg/m². Patient is a 54 y.o. male in no acute distress and alert and oriented to person, place and time. Physical Exam  Constitutional: Patient in no acute distress. Neuro: Alert and oriented to person, place and time. Psych: Mood normal, affect normal  Skin: No rash noted  HEENT: Head: Normocephalic andatraumatic  Conjunctivae and EOM are normal. Pupils are equal, round  Nose:Normal  Right External Ear: Normal; Left External Ear: Normal  Mouth: Mucosa Moist  Neck: Supple  Lungs: Respiratory effort is normal  Cardiovascular: Warm & Pink  Abdomen: Soft, non-tender, non-distended with no CVA,  No flank tenderness,  Or hepatosplenomegaly   Lymphatics: No palpablelymphadenopathy. Assessment and Plan      1. Elevated PSA    2. Gross hematuria    3. Nocturia    4. Weak urinary stream    5. Frequency of urination           Plan:   F/u 6 mo to reassess LUTS  Will repeat psa in one year  Pt to restart flomax. Will reassess symptoms at next visit with bladder scan      Return in about 6 months (around 8/8/2021) for bladder scan.     Prescriptions Ordered:  Orders Placed This Encounter   Medications    tamsulosin (FLOMAX) 0.4 MG capsule     Sig: Take 1 capsule by mouth daily     Dispense:  90 capsule     Refill:  3     Orders Placed:  Orders Placed This Encounter   Procedures    PSA, Diagnostic     Standing Status:   Future     Standing Expiration Date:   2/8/2022 Triston Samson MD    Agree with the ROS entered by the MA.

## 2021-08-09 ENCOUNTER — HOSPITAL ENCOUNTER (OUTPATIENT)
Age: 56
Discharge: HOME | End: 2021-08-09
Payer: COMMERCIAL

## 2021-08-09 DIAGNOSIS — R97.20 ELEVATED PSA: ICD-10-CM

## 2021-08-09 LAB — PROSTATE SPECIFIC ANTIGEN: 7.38 UG/L

## 2021-08-09 PROCEDURE — 36415 COLL VENOUS BLD VENIPUNCTURE: CPT

## 2021-08-09 PROCEDURE — 84153 ASSAY OF PSA TOTAL: CPT

## 2021-08-16 ENCOUNTER — OFFICE VISIT (OUTPATIENT)
Dept: UROLOGY | Age: 56
End: 2021-08-16
Payer: COMMERCIAL

## 2021-08-16 VITALS
HEART RATE: 69 BPM | DIASTOLIC BLOOD PRESSURE: 82 MMHG | HEIGHT: 70 IN | WEIGHT: 224 LBS | SYSTOLIC BLOOD PRESSURE: 131 MMHG | BODY MASS INDEX: 32.07 KG/M2 | TEMPERATURE: 97 F

## 2021-08-16 DIAGNOSIS — R35.0 FREQUENCY OF URINATION: ICD-10-CM

## 2021-08-16 DIAGNOSIS — R97.20 ELEVATED PSA: Primary | ICD-10-CM

## 2021-08-16 DIAGNOSIS — R35.1 NOCTURIA: ICD-10-CM

## 2021-08-16 PROCEDURE — 99214 OFFICE O/P EST MOD 30 MIN: CPT | Performed by: UROLOGY

## 2021-08-16 ASSESSMENT — ENCOUNTER SYMPTOMS
SHORTNESS OF BREATH: 0
DIARRHEA: 0
WHEEZING: 0
NAUSEA: 0
COUGH: 0
BACK PAIN: 0
EYE PAIN: 0
EYE REDNESS: 0
CONSTIPATION: 0
ABDOMINAL PAIN: 0
VOMITING: 0

## 2021-08-16 NOTE — PROGRESS NOTES
[address] [address]  Dept:  [facility] Urology Office Note - Established    Patient:  Garrison Ellington  YOB: 1965  Date: 8/16/2021    The patient is a 54 y.o. male who presents todayfor evaluation of the following problems:   Chief Complaint   Patient presents with    Follow-up     Six month follow-up with PSA and PVR       HPI  Coit Breaker is a very pleasant 59-year-old gentleman who has a persistently elevated PSA. He did have a prostate biopsy earlier this year. This was a fusion biopsy based on an abnormal MRI. Unfortunately, his PSA has gone slightly higher. He does also have some lower urinary tract symptoms but these seem more related to dietary irritants than anything else. He had started Flomax but really did not feel that this improved his symptoms. His wife feels that his symptoms are exacerbated with caffeine and significant coffee consumption. Summary of old records: N/A    Additional History: N/A    Procedures Today: N/A    Urinalysis today:  No results found for this visit on 08/16/21. Last several PSA's:  Lab Results   Component Value Date    PSA 7.38 (H) 08/09/2021    PSA 4.68 (H) 03/30/2020     Last total testosterone:  No results found for: TESTOSTERONE    AUA Symptom Score (8/16/2021):   INCOMPLETE EMPTYING: How often have you had the sensation of not emptying your bladder?: Not at all  FREQUENCY: How often do you have to urinate less than every two hours?: Not at all  INTERMITTENCY: How often have you found you stopped and started again several times when you urinated?: Not at all  URGENCY: How often have you found it difficult to postpone urination?: Less than Half the time  WEAK STREAM: How often have you had a weak urinary stream?: Not at all  STRAINING: How often have you had to strain to start  urination?: Not at all  NOCTURIA: How many times did you typically get up at night to uriniate?: 1 Time  TOTAL I-PSS SCORE[de-identified] 3       Last BUN and creatinine:  Lab Results   Component Value Date    BUN 13 11/22/2020     Lab Results   Component Value Date    CREATININE 0.96 11/22/2020       Additional Lab/Culture results: none    Imaging Reviewed during this Office Visit: none  (results were independently reviewed by physician and radiology report verified)    PAST MEDICAL, FAMILY AND SOCIAL HISTORY UPDATE:  Past Medical History:   Diagnosis Date    Diverticulitis     Essential hypertension 3/4/2020    denies, no PCP    S/P appendectomy 3/4/2020    S/P cholecystectomy 3/4/2020    S/P left hemicolectomy 3/4/2020    Tobacco abuse      Past Surgical History:   Procedure Laterality Date    ANTERIOR CRUCIATE LIGAMENT REPAIR Left     APPENDECTOMY      CHOLECYSTECTOMY      COLECTOMY      colostomy    COLON SURGERY      colostomy reversal    COLONOSCOPY      multiple    COLONOSCOPY  05/27/2015    PROSTATE BIOPSY  01/27/2021    PROSTATE BIOPSY N/A 1/27/2021    FUSION PROSTATE BIOPSY, OFFICE NOTIFYING ULTRASOUND performed by Lizbet Tuttle MD at [place] [address]     No family history on file. Outpatient Medications Marked as Taking for the 8/16/21 encounter (Office Visit) with Lizbet Tuttle MD   Medication Sig Dispense Refill    tamsulosin (FLOMAX) 0.4 MG capsule Take 1 capsule by mouth daily 90 capsule 3    ciprofloxacin (CIPRO) 500 MG tablet Take 1 tablet by mouth 2 times daily Take first dose 1 hour prior to prostate biopsy and then twice a day thereafter until completed 6 tablet 0    dicyclomine (BENTYL) 10 MG capsule Take 1 capsule by mouth every 6 hours as needed (cramps, abdominal pain) 20 capsule 0    aspirin 81 MG tablet Take 81 mg by mouth daily      sildenafil (VIAGRA) 100 MG tablet Take 1 tablet by mouth daily as needed for Erectile Dysfunction 30 tablet 1    triamcinolone (KENALOG) 0.1 % cream Apply topically 2 times daily Apply topically 2 times daily.  30 g 0    traMADol (ULTRAM) 50 MG tablet Take 50 mg by mouth every 6 hours as needed for Pain.  diphenhydrAMINE (BENADRYL ALLERGY) 25 MG tablet Take 25 mg by mouth every 6 hours as needed for Itching         Patient has no known allergies. Social History     Tobacco Use   Smoking Status Current Every Day Smoker    Packs/day: 1.00    Years: 40.00    Pack years: 40.00    Types: Cigarettes   Smokeless Tobacco Never Used     (Ifpatient a smoker, smoking cessation counseling offered)    Social History     Substance and Sexual Activity   Alcohol Use Yes    Comment: rarely       REVIEW OF SYSTEMS:  Review of Systems    Physical Exam:      Vitals:    08/16/21 1543   BP: 131/82   Pulse: 69   Temp: 97 °F (36.1 °C)     Body mass index is 32.14 kg/m². Patient is a 54 y.o. male in no acute distress and alert and oriented to person, place and time. Physical Exam  Constitutional: Patient in no acute distress. Neuro: Alert and oriented to person, place and time. Psych: Mood normal, affect normal  Skin: No rash noted      Assessment and Plan      1. Elevated PSA    2. Frequency of urination    3. Nocturia           Plan:   Dc flomax  F/u 6 mo psa  Dietary irritant list      Return in about 6 months (around 2/16/2022) for psa. Prescriptions Ordered:  No orders of the defined types were placed in this encounter. Orders Placed:  Orders Placed This Encounter   Procedures    PSA, Diagnostic     Standing Status:   Future     Standing Expiration Date:   8/16/2022           Jade Valdez MD    Agree with the ROS entered by the MA.

## 2022-01-31 ENCOUNTER — HOSPITAL ENCOUNTER (OUTPATIENT)
Age: 57
Discharge: HOME | End: 2022-01-31
Payer: COMMERCIAL

## 2022-01-31 PROCEDURE — 36415 COLL VENOUS BLD VENIPUNCTURE: CPT

## 2022-01-31 PROCEDURE — 84153 ASSAY OF PSA TOTAL: CPT

## 2022-02-01 LAB — PROSTATE SPECIFIC ANTIGEN: 9.78 UG/L

## 2022-02-14 ENCOUNTER — OFFICE VISIT (OUTPATIENT)
Dept: UROLOGY | Age: 57
End: 2022-02-14
Payer: COMMERCIAL

## 2022-02-14 VITALS
DIASTOLIC BLOOD PRESSURE: 87 MMHG | SYSTOLIC BLOOD PRESSURE: 138 MMHG | HEIGHT: 70 IN | TEMPERATURE: 95 F | WEIGHT: 224 LBS | HEART RATE: 65 BPM | BODY MASS INDEX: 32.07 KG/M2

## 2022-02-14 DIAGNOSIS — R35.1 NOCTURIA: ICD-10-CM

## 2022-02-14 DIAGNOSIS — N52.01 ERECTILE DYSFUNCTION DUE TO ARTERIAL INSUFFICIENCY: ICD-10-CM

## 2022-02-14 DIAGNOSIS — N40.1 BPH WITH OBSTRUCTION/LOWER URINARY TRACT SYMPTOMS: Primary | ICD-10-CM

## 2022-02-14 DIAGNOSIS — R35.0 FREQUENCY OF URINATION: ICD-10-CM

## 2022-02-14 DIAGNOSIS — R97.20 ELEVATED PSA: Primary | ICD-10-CM

## 2022-02-14 DIAGNOSIS — N13.8 BPH WITH OBSTRUCTION/LOWER URINARY TRACT SYMPTOMS: Primary | ICD-10-CM

## 2022-02-14 DIAGNOSIS — R39.12 WEAK URINARY STREAM: ICD-10-CM

## 2022-02-14 DIAGNOSIS — R97.20 ELEVATED PSA: ICD-10-CM

## 2022-02-14 PROCEDURE — 99214 OFFICE O/P EST MOD 30 MIN: CPT | Performed by: UROLOGY

## 2022-02-14 RX ORDER — TAMSULOSIN HYDROCHLORIDE 0.4 MG/1
0.4 CAPSULE ORAL DAILY
Qty: 90 CAPSULE | Refills: 3 | Status: SHIPPED | OUTPATIENT
Start: 2022-02-14 | End: 2022-09-19 | Stop reason: SDUPTHER

## 2022-02-14 ASSESSMENT — ENCOUNTER SYMPTOMS
ABDOMINAL PAIN: 0
RESPIRATORY NEGATIVE: 1
DIARRHEA: 0
GASTROINTESTINAL NEGATIVE: 1
EYE REDNESS: 0
NAUSEA: 0
WHEEZING: 0
BACK PAIN: 0
EYES NEGATIVE: 1
CONSTIPATION: 0
SHORTNESS OF BREATH: 0
EYE PAIN: 0
COUGH: 0
VOMITING: 0

## 2022-02-14 NOTE — PROGRESS NOTES
[address] [address]  Dept: 92 [facility] Urology Office Note - Established    Patient:  Elyse Altamirano  YOB: 1965  Date: 2/14/2022    The patient is a 64 y.o. male who presents todayfor evaluation of the following problems:   Chief Complaint   Patient presents with    6 Month Follow-Up     6 months with PSA       HPI  This is a very pleasant 77-year-old gentleman who has a history of an elevated PSA. Unfortunately, his PSA has risen to 9.78. He did have a prostate biopsy last year which was benign. This was a fusion biopsy for a PI-RADS 3 MRI lesion. He does have some worsening urinary symptoms including frequency, nocturia, and a weak stream.  He does intermittently struggle with erectile dysfunction as well but has not required the use of sildenafil recently. Summary of old records: N/A    Additional History: N/A    Procedures Today: N/A    Urinalysis today:  No results found for this visit on 02/14/22. Last several PSA's:  Lab Results   Component Value Date    PSA 9.78 (H) 01/31/2022    PSA 7.38 (H) 08/09/2021    PSA 4.68 (H) 03/30/2020     Last total testosterone:  No results found for: TESTOSTERONE    AUA Symptom Score (2/14/2022):                                Last BUN and creatinine:  Lab Results   Component Value Date    BUN 13 11/22/2020     Lab Results   Component Value Date    CREATININE 0.96 11/22/2020       Additional Lab/Culture results: none    Imaging Reviewed during this Office Visit: none  (results were independently reviewed by physician and radiology report verified)    PAST MEDICAL, FAMILY AND SOCIAL HISTORY UPDATE:  Past Medical History:   Diagnosis Date    Diverticulitis     Essential hypertension 3/4/2020    denies, no PCP    S/P appendectomy 3/4/2020    S/P cholecystectomy 3/4/2020    S/P left hemicolectomy 3/4/2020    Tobacco abuse      Past Surgical History:   Procedure Laterality Date    ANTERIOR CRUCIATE LIGAMENT REPAIR Left     APPENDECTOMY      CHOLECYSTECTOMY      COLECTOMY      colostomy    COLON SURGERY      colostomy reversal    COLONOSCOPY      multiple    COLONOSCOPY  05/27/2015    PROSTATE BIOPSY  01/27/2021    PROSTATE BIOPSY N/A 1/27/2021    FUSION PROSTATE BIOPSY, OFFICE NOTIFYING ULTRASOUND performed by Bre Johnson MD at [place] [address]     No family history on file. Outpatient Medications Marked as Taking for the 2/14/22 encounter (Office Visit) with Bre Johnson MD   Medication Sig Dispense Refill    tamsulosin (FLOMAX) 0.4 MG capsule Take 1 capsule by mouth daily 90 capsule 3    ciprofloxacin (CIPRO) 500 MG tablet Take 1 tablet by mouth 2 times daily Take first dose 1 hour prior to prostate biopsy and then twice a day thereafter until completed 6 tablet 0    dicyclomine (BENTYL) 10 MG capsule Take 1 capsule by mouth every 6 hours as needed (cramps, abdominal pain) 20 capsule 0    aspirin 81 MG tablet Take 81 mg by mouth daily      triamcinolone (KENALOG) 0.1 % cream Apply topically 2 times daily Apply topically 2 times daily. 30 g 0    traMADol (ULTRAM) 50 MG tablet Take 50 mg by mouth every 6 hours as needed for Pain.  diphenhydrAMINE (BENADRYL ALLERGY) 25 MG tablet Take 25 mg by mouth every 6 hours as needed for Itching         Patient has no known allergies. Social History     Tobacco Use   Smoking Status Current Every Day Smoker    Packs/day: 1.00    Years: 40.00    Pack years: 40.00    Types: Cigarettes   Smokeless Tobacco Never Used     (Ifpatient a smoker, smoking cessation counseling offered)    Social History     Substance and Sexual Activity   Alcohol Use Yes    Comment: rarely       REVIEW OF SYSTEMS:  Review of Systems    Physical Exam:      Vitals:    02/14/22 1452   BP: 138/87   Pulse: 65   Temp: 95 °F (35 °C)     Body mass index is 32.14 kg/m².   Patient is a 64 y.o. male in no acute distress and alert and oriented to person, place and time. Physical Exam  Constitutional: Patient in no acute distress. Neuro: Alert and oriented to person, place and time. Psych: Mood normal, affect normal      Assessment and Plan      1. BPH with obstruction/lower urinary tract symptoms    2. Elevated PSA    3. Weak urinary stream    4. Frequency of urination    5. Nocturia    6. Erectile dysfunction due to arterial insufficiency           Plan:   Prostate mri  F/u after above  Will consider repeat psa      Return in about 4 weeks (around 3/14/2022) for prostate mri. Prescriptions Ordered:  Orders Placed This Encounter   Medications    tamsulosin (FLOMAX) 0.4 MG capsule     Sig: Take 1 capsule by mouth daily     Dispense:  90 capsule     Refill:  3     Orders Placed:  No orders of the defined types were placed in this encounter. Milton Bullock MD    Agree with the ROS entered by the MA.

## 2022-02-22 ENCOUNTER — HOSPITAL ENCOUNTER (OUTPATIENT)
Dept: MRI IMAGING | Age: 57
Discharge: HOME | End: 2022-02-24
Payer: COMMERCIAL

## 2022-02-22 DIAGNOSIS — R97.20 ELEVATED PSA: ICD-10-CM

## 2022-02-22 PROCEDURE — 72197 MRI PELVIS W/O & W/DYE: CPT

## 2022-02-22 PROCEDURE — A9579 GAD-BASE MR CONTRAST NOS,1ML: HCPCS | Performed by: UROLOGY

## 2022-02-22 PROCEDURE — 6360000004 HC RX CONTRAST MEDICATION: Performed by: UROLOGY

## 2022-02-22 PROCEDURE — 2580000003 HC RX 258: Performed by: UROLOGY

## 2022-02-22 RX ORDER — SODIUM CHLORIDE 0.9 % (FLUSH) 0.9 %
10 SYRINGE (ML) INJECTION PRN
Status: DISCONTINUED | OUTPATIENT
Start: 2022-02-22 | End: 2022-02-25 | Stop reason: HOSPADM

## 2022-02-22 RX ORDER — 0.9 % SODIUM CHLORIDE 0.9 %
40 INTRAVENOUS SOLUTION INTRAVENOUS ONCE
Status: COMPLETED | OUTPATIENT
Start: 2022-02-22 | End: 2022-02-22

## 2022-02-22 RX ADMIN — GADOTERIDOL 20 ML: 279.3 INJECTION, SOLUTION INTRAVENOUS at 20:49

## 2022-02-22 RX ADMIN — SODIUM CHLORIDE, PRESERVATIVE FREE 10 ML: 5 INJECTION INTRAVENOUS at 20:49

## 2022-02-22 RX ADMIN — SODIUM CHLORIDE 40 ML: 9 INJECTION, SOLUTION INTRAVENOUS at 20:49

## 2022-03-14 ENCOUNTER — OFFICE VISIT (OUTPATIENT)
Dept: UROLOGY | Age: 57
End: 2022-03-14
Payer: COMMERCIAL

## 2022-03-14 VITALS
HEIGHT: 70 IN | DIASTOLIC BLOOD PRESSURE: 82 MMHG | HEART RATE: 75 BPM | WEIGHT: 230 LBS | SYSTOLIC BLOOD PRESSURE: 137 MMHG | BODY MASS INDEX: 32.93 KG/M2 | TEMPERATURE: 98 F

## 2022-03-14 DIAGNOSIS — N40.1 BPH WITH OBSTRUCTION/LOWER URINARY TRACT SYMPTOMS: Primary | ICD-10-CM

## 2022-03-14 DIAGNOSIS — R35.0 FREQUENCY OF URINATION: ICD-10-CM

## 2022-03-14 DIAGNOSIS — R97.20 ELEVATED PSA: ICD-10-CM

## 2022-03-14 DIAGNOSIS — N13.8 BPH WITH OBSTRUCTION/LOWER URINARY TRACT SYMPTOMS: Primary | ICD-10-CM

## 2022-03-14 DIAGNOSIS — R39.12 WEAK URINARY STREAM: ICD-10-CM

## 2022-03-14 PROCEDURE — 99214 OFFICE O/P EST MOD 30 MIN: CPT | Performed by: UROLOGY

## 2022-03-14 ASSESSMENT — ENCOUNTER SYMPTOMS
COLOR CHANGE: 0
NAUSEA: 0
VOMITING: 0
COUGH: 0
EYE REDNESS: 0
ABDOMINAL PAIN: 0
BACK PAIN: 0
EYE PAIN: 0
WHEEZING: 0
SHORTNESS OF BREATH: 0

## 2022-03-14 NOTE — PROGRESS NOTES
[address] [facility] [address]  Dept:  [facility] Urology Office Note - Established    Patient:  Karan Meyers  YOB: 1965  Date: 3/14/2022    The patient is a 64 y.o. male who presents todayfor evaluation of the following problems:   Chief Complaint   Patient presents with    Follow-up     f/u MRI        HPI  This is a very pleasant 54-year-old gentleman who has a persistently elevated PSA. He has had multiple prostate biopsies in the past including a fusion biopsy. Because his PSA is risen to 9.7, we ordered a repeat MRI. Thankfully there are no suspicious lesions and the lesion we had seen on the last MRI was not evident on this particular study. He does continue to take tamsulosin for his lower urinary tract symptoms. He is somewhat bothered by his symptoms but tolerating them okay right now. Summary of old records: N/A    Additional History: N/A    Procedures Today: N/A    Urinalysis today:  No results found for this visit on 03/14/22. Last several PSA's:  Lab Results   Component Value Date    PSA 9.78 (H) 01/31/2022    PSA 7.38 (H) 08/09/2021    PSA 4.68 (H) 03/30/2020     Last total testosterone:  No results found for: TESTOSTERONE    AUA Symptom Score (3/14/2022):   INCOMPLETE EMPTYING: How often have you had the sensation of not emptying your bladder?: Not at all  FREQUENCY: How often do you have to urinate less than every two hours?: Not at all  INTERMITTENCY: How often have you found you stopped and started again several times when you urinated?: Not at all  URGENCY: How often have you found it difficult to postpone urination?: Not at all  WEAK STREAM: How often have you had a weak urinary stream?: Not at all  STRAINING: How often have you had to strain to start  urination?: Not at all  NOCTURIA: How many times did you typically get up at night to uriniate?: 1 Time  TOTAL I-PSS SCORE[de-identified] 1  How would you feel if you were to spend the rest of your life with your urinary condition?: Delighted    Last BUN and creatinine:  Lab Results   Component Value Date    BUN 13 11/22/2020     Lab Results   Component Value Date    CREATININE 0.96 11/22/2020       Additional Lab/Culture results: none    Imaging Reviewed during this Office Visit: none  (results were independently reviewed by physician and radiology report verified)    PAST MEDICAL, FAMILY AND SOCIAL HISTORY UPDATE:  Past Medical History:   Diagnosis Date    Diverticulitis     Essential hypertension 3/4/2020    denies, no PCP    S/P appendectomy 3/4/2020    S/P cholecystectomy 3/4/2020    S/P left hemicolectomy 3/4/2020    Tobacco abuse      Past Surgical History:   Procedure Laterality Date    ANTERIOR CRUCIATE LIGAMENT REPAIR Left     APPENDECTOMY      CHOLECYSTECTOMY      COLECTOMY      colostomy    COLON SURGERY      colostomy reversal    COLONOSCOPY      multiple    COLONOSCOPY  05/27/2015    PROSTATE BIOPSY  01/27/2021    PROSTATE BIOPSY N/A 1/27/2021    FUSION PROSTATE BIOPSY, OFFICE NOTIFYING ULTRASOUND performed by Cynthia Romeo MD at [place] [address]     No family history on file. Outpatient Medications Marked as Taking for the 3/14/22 encounter (Office Visit) with Cynthia Romeo MD   Medication Sig Dispense Refill    tamsulosin (FLOMAX) 0.4 MG capsule Take 1 capsule by mouth daily 90 capsule 3    ciprofloxacin (CIPRO) 500 MG tablet Take 1 tablet by mouth 2 times daily Take first dose 1 hour prior to prostate biopsy and then twice a day thereafter until completed 6 tablet 0    dicyclomine (BENTYL) 10 MG capsule Take 1 capsule by mouth every 6 hours as needed (cramps, abdominal pain) 20 capsule 0    aspirin 81 MG tablet Take 81 mg by mouth daily      triamcinolone (KENALOG) 0.1 % cream Apply topically 2 times daily Apply topically 2 times daily.  30 g 0    traMADol (ULTRAM) 50 MG tablet Take 50 mg by mouth every 6 hours as needed for Pain.  diphenhydrAMINE (BENADRYL ALLERGY) 25 MG tablet Take 25 mg by mouth every 6 hours as needed for Itching         Patient has no known allergies. Social History     Tobacco Use   Smoking Status Current Every Day Smoker    Packs/day: 1.00    Years: 40.00    Pack years: 40.00    Types: Cigarettes   Smokeless Tobacco Never Used     (Ifpatient a smoker, smoking cessation counseling offered)    Social History     Substance and Sexual Activity   Alcohol Use Yes    Comment: rarely       REVIEW OF SYSTEMS:  Review of Systems    Physical Exam:      Vitals:    03/14/22 1515   BP: 137/82   Pulse: 75   Temp: 98 °F (36.7 °C)     Body mass index is 33 kg/m². Patient is a 64 y.o. male in no acute distress and alert and oriented to person, place and time. Physical Exam  Constitutional: Patient in no acute distress. Neuro: Alert and oriented to person, place and time. Psych: Mood normal, affect normal  Skin: No rash noted  HEENT: Head: Normocephalic andatraumatic  Conjunctivae and EOM are normal. Pupils are equal, round  Nose:Normal  Right External Ear: Normal; Left External Ear: Normal  Mouth: Mucosa Moist      Assessment and Plan      1. BPH with obstruction/lower urinary tract symptoms    2. Elevated PSA    3. Frequency of urination    4. Weak urinary stream           Plan:   Cont tamsulosin  F/u 6 mo psa  Discussed mri; pt has had multiple bx's in past that were benign and mri nonconernring. Return in about 6 months (around 9/14/2022) for psa. Prescriptions Ordered:  No orders of the defined types were placed in this encounter. Orders Placed:  No orders of the defined types were placed in this encounter. Teresa Yancey MD    Agree with the ROS entered by the MA.

## 2022-09-12 ENCOUNTER — HOSPITAL ENCOUNTER (OUTPATIENT)
Age: 57
Discharge: HOME | End: 2022-09-12
Payer: COMMERCIAL

## 2022-09-12 DIAGNOSIS — R97.20 ELEVATED PSA: ICD-10-CM

## 2022-09-12 LAB — PROSTATE SPECIFIC ANTIGEN: 7.19 NG/ML

## 2022-09-12 PROCEDURE — 84153 ASSAY OF PSA TOTAL: CPT

## 2022-09-12 PROCEDURE — 36415 COLL VENOUS BLD VENIPUNCTURE: CPT

## 2022-09-19 ENCOUNTER — OFFICE VISIT (OUTPATIENT)
Dept: UROLOGY | Age: 57
End: 2022-09-19
Payer: COMMERCIAL

## 2022-09-19 VITALS — BODY MASS INDEX: 32.93 KG/M2 | HEIGHT: 70 IN | WEIGHT: 230 LBS

## 2022-09-19 DIAGNOSIS — R39.12 WEAK URINARY STREAM: ICD-10-CM

## 2022-09-19 DIAGNOSIS — N40.1 BPH WITH OBSTRUCTION/LOWER URINARY TRACT SYMPTOMS: Primary | ICD-10-CM

## 2022-09-19 DIAGNOSIS — R35.0 FREQUENCY OF URINATION: ICD-10-CM

## 2022-09-19 DIAGNOSIS — N13.8 BPH WITH OBSTRUCTION/LOWER URINARY TRACT SYMPTOMS: Primary | ICD-10-CM

## 2022-09-19 DIAGNOSIS — R97.20 ELEVATED PSA: ICD-10-CM

## 2022-09-19 DIAGNOSIS — R35.1 NOCTURIA: ICD-10-CM

## 2022-09-19 PROCEDURE — 99213 OFFICE O/P EST LOW 20 MIN: CPT | Performed by: UROLOGY

## 2022-09-19 RX ORDER — TAMSULOSIN HYDROCHLORIDE 0.4 MG/1
0.4 CAPSULE ORAL DAILY
Qty: 90 CAPSULE | Refills: 3 | Status: SHIPPED | OUTPATIENT
Start: 2022-09-19

## 2022-09-19 ASSESSMENT — ENCOUNTER SYMPTOMS
SHORTNESS OF BREATH: 0
ABDOMINAL PAIN: 0
NAUSEA: 0
EYE PAIN: 0
EYE REDNESS: 0
RESPIRATORY NEGATIVE: 1
WHEEZING: 0
BACK PAIN: 0
CONSTIPATION: 0
DIARRHEA: 0
EYES NEGATIVE: 1
GASTROINTESTINAL NEGATIVE: 1
COUGH: 0
VOMITING: 0

## 2022-09-19 NOTE — PROGRESS NOTES
[address] [address]  Dept: [address] [facility] Urology Office Note - Established    Patient:  Calixto Mcintyre  YOB: 1965  Date: 9/19/2022    The patient is a 62 y.o. male who presents todayfor evaluation of the following problems:   Chief Complaint   Patient presents with    6 Month Follow-Up     6 month with PSA       HPI  This is a very pleasant 70-year-old gentleman who has a persistently elevated PSA. He has had multiple prostate biopsies which were benign. He has also had a nonconcerning MRI. He continues to void well with Flomax. His PSA has dropped to 7. 19. Summary of old records: N/A    Additional History: N/A    Procedures Today: N/A    Urinalysis today:  No results found for this visit on 09/19/22. Last several PSA's:  Lab Results   Component Value Date    PSA 7.19 (H) 09/12/2022    PSA 9.78 (H) 01/31/2022    PSA 7.38 (H) 08/09/2021     Last total testosterone:  No results found for: TESTOSTERONE    AUA Symptom Score (9/19/2022):                                Last BUN and creatinine:  Lab Results   Component Value Date    BUN 13 11/22/2020     Lab Results   Component Value Date    CREATININE 0.96 11/22/2020       Additional Lab/Culture results: none    Imaging Reviewed during this Office Visit: none    (results were independently reviewed by physician and radiology report verified)    PAST MEDICAL, FAMILY AND SOCIAL HISTORY UPDATE:  Past Medical History:   Diagnosis Date    Diverticulitis     Essential hypertension 3/4/2020    denies, no PCP    S/P appendectomy 3/4/2020    S/P cholecystectomy 3/4/2020    S/P left hemicolectomy 3/4/2020    Tobacco abuse      Past Surgical History:   Procedure Laterality Date    ANTERIOR CRUCIATE LIGAMENT REPAIR Left     APPENDECTOMY      CHOLECYSTECTOMY      COLECTOMY      colostomy    COLON SURGERY      colostomy reversal    COLONOSCOPY multiple    COLONOSCOPY  05/27/2015    PROSTATE BIOPSY  01/27/2021    PROSTATE BIOPSY N/A 1/27/2021    FUSION PROSTATE BIOPSY, OFFICE NOTIFYING ULTRASOUND performed by Genie Erazo MD at [place] [address]     No family history on file. Outpatient Medications Marked as Taking for the 9/19/22 encounter (Office Visit) with Genie Erazo MD   Medication Sig Dispense Refill    tamsulosin (FLOMAX) 0.4 MG capsule Take 1 capsule by mouth daily 90 capsule 3    dicyclomine (BENTYL) 10 MG capsule Take 1 capsule by mouth every 6 hours as needed (cramps, abdominal pain) 20 capsule 0    aspirin 81 MG tablet Take 81 mg by mouth daily      triamcinolone (KENALOG) 0.1 % cream Apply topically 2 times daily Apply topically 2 times daily. 30 g 0    traMADol (ULTRAM) 50 MG tablet Take 50 mg by mouth every 6 hours as needed for Pain. diphenhydrAMINE (BENADRYL) 25 MG tablet Take 25 mg by mouth every 6 hours as needed for Itching         Patient has no known allergies. Social History     Tobacco Use   Smoking Status Every Day    Packs/day: 1.00    Years: 40.00    Pack years: 40.00    Types: Cigarettes   Smokeless Tobacco Never     (Ifpatient a smoker, smoking cessation counseling offered)    Social History     Substance and Sexual Activity   Alcohol Use Yes    Comment: rarely       REVIEW OF SYSTEMS:  Review of Systems    Physical Exam:    There were no vitals filed for this visit. Body mass index is 33 kg/m². Patient is a 62 y.o. male in no acute distress and alert and oriented to person, place and time. Physical Exam  Constitutional: Patient in no acute distress. Neuro: Alert and oriented to person, place and time. Psych: Mood normal, affect normal  Skin: No rash noted  HEENT: Head: Normocephalic andatraumatic  Conjunctivae and EOM are normal. Pupils are equal, round  Nose:Normal  Right External Ear: Normal; Left External Ear: Normal  Mouth: Mucosa Moist      Assessment and Plan      1.  BPH with obstruction/lower urinary tract symptoms    2. Elevated PSA    3. Frequency of urination    4. Weak urinary stream    5. Nocturia           Plan:   Cont flomax  F/u 6 mo psa      Return in about 6 months (around 3/19/2023) for psa. Prescriptions Ordered:  Orders Placed This Encounter   Medications    tamsulosin (FLOMAX) 0.4 MG capsule     Sig: Take 1 capsule by mouth daily     Dispense:  90 capsule     Refill:  3       Orders Placed:  Orders Placed This Encounter   Procedures    PSA, Diagnostic     Standing Status:   Future     Standing Expiration Date:   9/19/2023             Venkatesh Shah MD    Agree with the ROS entered by the MA.

## 2022-12-08 ENCOUNTER — HOSPITAL ENCOUNTER (OUTPATIENT)
Age: 57
Setting detail: SPECIMEN
Discharge: HOME | End: 2022-12-08

## 2022-12-08 ENCOUNTER — OFFICE VISIT (OUTPATIENT)
Dept: UROLOGY | Age: 57
End: 2022-12-08
Payer: COMMERCIAL

## 2022-12-08 VITALS — BODY MASS INDEX: 32.93 KG/M2 | HEIGHT: 70 IN | WEIGHT: 230 LBS

## 2022-12-08 DIAGNOSIS — N13.8 BPH WITH OBSTRUCTION/LOWER URINARY TRACT SYMPTOMS: Primary | ICD-10-CM

## 2022-12-08 DIAGNOSIS — R35.0 FREQUENCY OF URINATION: ICD-10-CM

## 2022-12-08 DIAGNOSIS — N40.1 BPH WITH OBSTRUCTION/LOWER URINARY TRACT SYMPTOMS: Primary | ICD-10-CM

## 2022-12-08 DIAGNOSIS — R39.15 URGENCY OF URINATION: ICD-10-CM

## 2022-12-08 DIAGNOSIS — R97.20 ELEVATED PSA: ICD-10-CM

## 2022-12-08 PROCEDURE — 51798 US URINE CAPACITY MEASURE: CPT | Performed by: NURSE PRACTITIONER

## 2022-12-08 PROCEDURE — 99213 OFFICE O/P EST LOW 20 MIN: CPT | Performed by: NURSE PRACTITIONER

## 2022-12-08 RX ORDER — TAMSULOSIN HYDROCHLORIDE 0.4 MG/1
0.4 CAPSULE ORAL 2 TIMES DAILY
Qty: 180 CAPSULE | Refills: 3 | Status: SHIPPED | OUTPATIENT
Start: 2022-12-08

## 2022-12-08 ASSESSMENT — ENCOUNTER SYMPTOMS
EYES NEGATIVE: 1
VOMITING: 0
DIARRHEA: 0
CONSTIPATION: 0
SHORTNESS OF BREATH: 0
ABDOMINAL PAIN: 0
GASTROINTESTINAL NEGATIVE: 1
BACK PAIN: 0
EYE PAIN: 0
COUGH: 0
WHEEZING: 0
EYE REDNESS: 0
RESPIRATORY NEGATIVE: 1
NAUSEA: 0

## 2022-12-08 NOTE — PROGRESS NOTES
Review of Systems   Constitutional: Negative. Negative for appetite change, chills and fatigue. Eyes: Negative. Negative for pain, redness and visual disturbance. Respiratory: Negative. Negative for cough, shortness of breath and wheezing. Cardiovascular: Negative. Negative for chest pain and leg swelling. Gastrointestinal: Negative. Negative for abdominal pain, constipation, diarrhea, nausea and vomiting. Genitourinary:  Positive for urgency. Negative for difficulty urinating, dysuria, flank pain, frequency and hematuria. Musculoskeletal: Negative. Negative for back pain, joint swelling and myalgias. Skin: Negative. Negative for rash and wound. Neurological: Negative. Negative for dizziness, weakness and numbness. Hematological: Negative. Does not bruise/bleed easily. Patient stated \"At night when I urinate, my back starts to hurt but, it never hurts during the day. \"

## 2022-12-08 NOTE — PROGRESS NOTES
[address] [place] [address]  Dept: 92 [facility] Urology Office Note - Established    Patient:  Margreta Schlatter  YOB: 1965  Date: 12/08/2022    The patient is a 62 y.o. male whopresents today for evaluation of the following problems:   Chief Complaint   Patient presents with    Urinary Urgency     Urgency, incomplete bladder emptying        HPI  Patient is presenting for f/u urinary frequency, urgency, and incomplete emptying. Hx of elevated PSA with neg bx and non-concerning MRI. Takes flomax daily. Voiding 2-3x/hour. Nocturia x1-2. Weak and intermittent stream. No hematuria or dysuria, no recent infections. No recent cysto. Summary of old records: N/A    Additional History: N/A    Procedures Today: N/A    Urinalysis today:  No results found for this visit on 12/08/22.     Imaging Reviewed during this Office Visit: none  (results were independently reviewed by physician and radiology report verified)      Last BUN and creatinine:  Lab Results   Component Value Date    BUN 13 11/22/2020     Lab Results   Component Value Date    CREATININE 0.96 11/22/2020       Additional Lab/Culture results: none    PAST MEDICAL, FAMILY AND SOCIAL HISTORY UPDATE:  Past Medical History:   Diagnosis Date    Diverticulitis     Essential hypertension 3/4/2020    denies, no PCP    S/P appendectomy 3/4/2020    S/P cholecystectomy 3/4/2020    S/P left hemicolectomy 3/4/2020    Tobacco abuse      Past Surgical History:   Procedure Laterality Date    ANTERIOR CRUCIATE LIGAMENT REPAIR Left     APPENDECTOMY      CHOLECYSTECTOMY      COLECTOMY      colostomy    COLON SURGERY      colostomy reversal    COLONOSCOPY      multiple    COLONOSCOPY  05/27/2015    PROSTATE BIOPSY  01/27/2021    PROSTATE BIOPSY N/A 1/27/2021    FUSION PROSTATE BIOPSY, OFFICE NOTIFYING ULTRASOUND performed by Vasquez Portillo MD at [place] [address] No family history on file. Outpatient Medications Marked as Taking for the 12/8/22 encounter (Office Visit) with PARIS Ruby CNP   Medication Sig Dispense Refill    tamsulosin (FLOMAX) 0.4 MG capsule Take 1 capsule by mouth in the morning and at bedtime 180 capsule 3    dicyclomine (BENTYL) 10 MG capsule Take 1 capsule by mouth every 6 hours as needed (cramps, abdominal pain) 20 capsule 0    aspirin 81 MG tablet Take 81 mg by mouth daily      triamcinolone (KENALOG) 0.1 % cream Apply topically 2 times daily Apply topically 2 times daily. 30 g 0    traMADol (ULTRAM) 50 MG tablet Take 50 mg by mouth every 6 hours as needed for Pain. diphenhydrAMINE (BENADRYL) 25 MG tablet Take 25 mg by mouth every 6 hours as needed for Itching        (All medications reviewed and updated by provider sincelast office visit or hospitalization)   Patient has no known allergies. Social History     Tobacco Use   Smoking Status Every Day    Packs/day: 1.00    Years: 40.00    Pack years: 40.00    Types: Cigarettes   Smokeless Tobacco Never      (If patient a smoker, smoking cessation counseling offered)     Social History     Substance and Sexual Activity   Alcohol Use Yes    Comment: rarely       REVIEW OF SYSTEMS:  Review of Systems      Physical Exam:    There were no vitals filed for this visit. Body mass index is 33 kg/m². Patient is a 62 y.o. female in noacute distress and alert and oriented to person, place and time. Physical Exam  Constitutional: Patient in no acute distress. Neuro: Alert andoriented to person, place and time. Lungs: Respiratory effort is normal  Cardiovascular: Warm & Pink  Abdomen: Soft, non-tender, non-distended   Bladder non-tender and not distended. PVR 171cc  Musculoskeletal: Normal gait and station      and Plan      1. BPH with obstruction/lower urinary tract symptoms    2. Frequency of urination    3. Urgency of urination    4. Elevated PSA           Plan:   LUTS are worsening. PVR 171cc- discussed [facility], pt declined. Urine culture today to r/o infection  Increase Flomax to BID  Cysto with Dr. Bard Jaquez    Return for Cysto. Prescriptions Ordered:  Orders Placed This Encounter   Medications    tamsulosin (FLOMAX) 0.4 MG capsule     Sig: Take 1 capsule by mouth in the morning and at bedtime     Dispense:  180 capsule     Refill:  3      Orders Placed:  Orders Placed This Encounter   Procedures    Culture, Urine     Standing Status:   Future     Number of Occurrences:   1     Standing Expiration Date:   12/8/2023     Order Specific Question:   Specify (ex-cath, midstream, cysto, etc)? Answer:   mid stream    IA MEASUREMENT,POST-VOID RESIDUAL VOLUME BY US,NON-IMAGING            PARIS Vasquez - CNP    Reviewed and agree with the ROS entered by the MA.

## 2022-12-09 LAB
CULTURE: NO GROWTH
SPECIMEN DESCRIPTION: NORMAL

## 2022-12-29 ENCOUNTER — TELEPHONE (OUTPATIENT)
Dept: UROLOGY | Age: 57
End: 2022-12-29

## 2022-12-29 ENCOUNTER — HOSPITAL ENCOUNTER (EMERGENCY)
Age: 57
Discharge: HOME | End: 2022-12-29
Attending: EMERGENCY MEDICINE
Payer: COMMERCIAL

## 2022-12-29 VITALS
HEIGHT: 70 IN | SYSTOLIC BLOOD PRESSURE: 141 MMHG | HEART RATE: 81 BPM | RESPIRATION RATE: 18 BRPM | TEMPERATURE: 99.1 F | BODY MASS INDEX: 32.93 KG/M2 | DIASTOLIC BLOOD PRESSURE: 78 MMHG | WEIGHT: 230 LBS | OXYGEN SATURATION: 97 %

## 2022-12-29 DIAGNOSIS — N30.00 ACUTE CYSTITIS WITHOUT HEMATURIA: Primary | ICD-10-CM

## 2022-12-29 DIAGNOSIS — R33.9 URINARY RETENTION: ICD-10-CM

## 2022-12-29 LAB
ABSOLUTE EOS #: 0.29 K/UL (ref 0–0.44)
ABSOLUTE IMMATURE GRANULOCYTE: 0.06 K/UL (ref 0–0.3)
ABSOLUTE LYMPH #: 1.86 K/UL (ref 1.1–3.7)
ABSOLUTE MONO #: 0.72 K/UL (ref 0.1–1.2)
ANION GAP SERPL CALCULATED.3IONS-SCNC: 11 MMOL/L (ref 9–17)
BASOPHILS # BLD: 1 % (ref 0–2)
BASOPHILS ABSOLUTE: 0.07 K/UL (ref 0–0.2)
BILIRUBIN URINE: NEGATIVE
BUN BLDV-MCNC: 12 MG/DL (ref 6–20)
CALCIUM SERPL-MCNC: 9.3 MG/DL (ref 8.6–10.4)
CASTS UA: NORMAL /LPF (ref 0–8)
CHLORIDE BLD-SCNC: 97 MMOL/L (ref 98–107)
CO2: 28 MMOL/L (ref 20–31)
COLOR: YELLOW
CREAT SERPL-MCNC: 0.75 MG/DL (ref 0.7–1.2)
EOSINOPHILS RELATIVE PERCENT: 3 % (ref 1–4)
EPITHELIAL CELLS UA: NORMAL /HPF (ref 0–5)
GFR SERPL CREATININE-BSD FRML MDRD: >60 ML/MIN/1.73M2
GLUCOSE BLD-MCNC: 196 MG/DL (ref 70–99)
GLUCOSE URINE: NEGATIVE
HCT VFR BLD CALC: 51.3 % (ref 40.7–50.3)
HEMOGLOBIN: 17.1 G/DL (ref 13–17)
IMMATURE GRANULOCYTES: 1 %
KETONES, URINE: NEGATIVE
LEUKOCYTE ESTERASE, URINE: ABNORMAL
LYMPHOCYTES # BLD: 19 % (ref 24–43)
MCH RBC QN AUTO: 28.2 PG (ref 25.2–33.5)
MCHC RBC AUTO-ENTMCNC: 33.3 G/DL (ref 28.4–34.8)
MCV RBC AUTO: 84.7 FL (ref 82.6–102.9)
MONOCYTES # BLD: 7 % (ref 3–12)
NITRITE, URINE: NEGATIVE
NRBC AUTOMATED: 0 PER 100 WBC
PDW BLD-RTO: 12.1 % (ref 11.8–14.4)
PH UA: 5.5 (ref 5–8)
PLATELET # BLD: 262 K/UL (ref 138–453)
PMV BLD AUTO: 11.6 FL (ref 8.1–13.5)
POTASSIUM SERPL-SCNC: 4.2 MMOL/L (ref 3.7–5.3)
PROTEIN UA: ABNORMAL
RBC # BLD: 6.06 M/UL (ref 4.21–5.77)
RBC UA: NORMAL /HPF (ref 0–4)
SEG NEUTROPHILS: 69 % (ref 36–65)
SEGMENTED NEUTROPHILS ABSOLUTE COUNT: 7.05 K/UL (ref 1.5–8.1)
SODIUM BLD-SCNC: 136 MMOL/L (ref 135–144)
SPECIFIC GRAVITY UA: 1.02 (ref 1–1.03)
TURBIDITY: CLEAR
URINE HGB: NEGATIVE
UROBILINOGEN, URINE: NORMAL
WBC # BLD: 10.1 K/UL (ref 3.5–11.3)
WBC UA: NORMAL /HPF (ref 0–5)

## 2022-12-29 PROCEDURE — 2580000003 HC RX 258: Performed by: STUDENT IN AN ORGANIZED HEALTH CARE EDUCATION/TRAINING PROGRAM

## 2022-12-29 PROCEDURE — 85025 COMPLETE CBC W/AUTO DIFF WBC: CPT

## 2022-12-29 PROCEDURE — 80048 BASIC METABOLIC PNL TOTAL CA: CPT

## 2022-12-29 PROCEDURE — 81001 URINALYSIS AUTO W/SCOPE: CPT

## 2022-12-29 PROCEDURE — 6360000002 HC RX W HCPCS: Performed by: STUDENT IN AN ORGANIZED HEALTH CARE EDUCATION/TRAINING PROGRAM

## 2022-12-29 PROCEDURE — 99284 EMERGENCY DEPT VISIT MOD MDM: CPT

## 2022-12-29 PROCEDURE — 6370000000 HC RX 637 (ALT 250 FOR IP): Performed by: STUDENT IN AN ORGANIZED HEALTH CARE EDUCATION/TRAINING PROGRAM

## 2022-12-29 PROCEDURE — 96374 THER/PROPH/DIAG INJ IV PUSH: CPT

## 2022-12-29 RX ORDER — 0.9 % SODIUM CHLORIDE 0.9 %
1000 INTRAVENOUS SOLUTION INTRAVENOUS ONCE
Status: COMPLETED | OUTPATIENT
Start: 2022-12-29 | End: 2022-12-29

## 2022-12-29 RX ORDER — CEPHALEXIN 250 MG/1
500 CAPSULE ORAL ONCE
Status: COMPLETED | OUTPATIENT
Start: 2022-12-29 | End: 2022-12-29

## 2022-12-29 RX ORDER — ACETAMINOPHEN 500 MG
1000 TABLET ORAL ONCE
Status: COMPLETED | OUTPATIENT
Start: 2022-12-29 | End: 2022-12-29

## 2022-12-29 RX ORDER — CEPHALEXIN 500 MG/1
500 CAPSULE ORAL 2 TIMES DAILY
Qty: 14 CAPSULE | Refills: 0 | Status: SHIPPED | OUTPATIENT
Start: 2022-12-29 | End: 2023-01-05

## 2022-12-29 RX ORDER — IBUPROFEN 800 MG/1
800 TABLET ORAL EVERY 8 HOURS PRN
Qty: 42 TABLET | Refills: 0 | Status: SHIPPED | OUTPATIENT
Start: 2022-12-29 | End: 2023-01-12

## 2022-12-29 RX ORDER — FENTANYL CITRATE 50 UG/ML
50 INJECTION, SOLUTION INTRAMUSCULAR; INTRAVENOUS ONCE
Status: COMPLETED | OUTPATIENT
Start: 2022-12-29 | End: 2022-12-29

## 2022-12-29 RX ORDER — ACETAMINOPHEN 500 MG
1000 TABLET ORAL EVERY 6 HOURS PRN
Qty: 112 TABLET | Refills: 0 | Status: SHIPPED | OUTPATIENT
Start: 2022-12-29 | End: 2023-01-12

## 2022-12-29 RX ADMIN — SODIUM CHLORIDE 1000 ML: 9 INJECTION, SOLUTION INTRAVENOUS at 15:58

## 2022-12-29 RX ADMIN — CEPHALEXIN 500 MG: 250 CAPSULE ORAL at 16:22

## 2022-12-29 RX ADMIN — ACETAMINOPHEN 1000 MG: 500 TABLET ORAL at 16:22

## 2022-12-29 RX ADMIN — FENTANYL CITRATE 50 MCG: 50 INJECTION, SOLUTION INTRAMUSCULAR; INTRAVENOUS at 15:58

## 2022-12-29 ASSESSMENT — PAIN - FUNCTIONAL ASSESSMENT
PAIN_FUNCTIONAL_ASSESSMENT: NONE - DENIES PAIN
PAIN_FUNCTIONAL_ASSESSMENT: ACTIVITIES ARE NOT PREVENTED

## 2022-12-29 ASSESSMENT — PAIN DESCRIPTION - LOCATION: LOCATION: BACK

## 2022-12-29 ASSESSMENT — PAIN SCALES - GENERAL: PAINLEVEL_OUTOF10: 6

## 2022-12-29 ASSESSMENT — ENCOUNTER SYMPTOMS
NAUSEA: 0
CONSTIPATION: 0
ABDOMINAL PAIN: 1
SHORTNESS OF BREATH: 0
VOMITING: 0
DIARRHEA: 0
BACK PAIN: 1

## 2022-12-29 ASSESSMENT — PAIN DESCRIPTION - ORIENTATION: ORIENTATION: LEFT

## 2022-12-29 ASSESSMENT — PAIN DESCRIPTION - DESCRIPTORS: DESCRIPTORS: TENDER;DULL

## 2022-12-29 NOTE — ED PROVIDER NOTES
[facility]  ED  Emergency Department Encounter  EmergencyMedicine Resident     Pt Name:Ernesto Tavera  MRN: [id number]  [place] 1965  Date of evaluation: 12/29/22  PCP:  No primary care provider on file. This patient was evaluated in the Emergency Department for symptoms described in the history of present illness. CHIEF COMPLAINT       Chief Complaint   Patient presents with    Urinary Frequency    Urinary Retention     Follows up Dr Nishi Gunn, urology has an appointment on 1/16/2023       HISTORY OF PRESENT ILLNESS  (Location/Symptom, Timing/Onset, Context/Setting, Quality, Duration, Modifying Factors, Severity.)      Vinnie Xavier is a 62 y.o. male with BPH on Flomax who presents with urinary retention and low back pain. Patient states that he has been dealing with urinary retention for several months. Patient has been following up with urology. Patient recently seen by urology and his Flomax dose was increased to twice daily. Patient states that he normally gets back pain when he has urinary retention. States that he is been having increased difficulty urinating. States that he has the sensation to urinate but is unable to void completely. States that his back pain improves when he voids his bladder. Back pain is in his lumbar back area. Denies chest pain or shortness of breath. No fever. No recent illnesses. Endorses slight suprapubic abdominal pain. No diarrhea or constipation. No hematuria. No dysuria. PAST MEDICAL / SURGICAL / SOCIAL / FAMILY HISTORY      has a past medical history of Diverticulitis, Essential hypertension, S/P appendectomy, S/P cholecystectomy, S/P left hemicolectomy, and Tobacco abuse.     has a past surgical history that includes Appendectomy; Cholecystectomy; colectomy; Colon surgery; Colonoscopy; Anterior cruciate ligament repair (Left); Colonoscopy (05/27/2015); Prostate Biopsy (01/27/2021); and Prostate biopsy (N/A, 1/27/2021).     Social History     Socioeconomic History    Marital status:      Spouse name: Not on file    Number of children: Not on file    Years of education: Not on file    Highest education level: Not on file   Occupational History    Not on file   Tobacco Use    Smoking status: Every Day     Packs/day: 1.00     Years: 40.00     Pack years: 40.00     Types: Cigarettes    Smokeless tobacco: Never   Substance and Sexual Activity    Alcohol use: Yes     Comment: rarely    Drug use: No    Sexual activity: Yes     Partners: Female   Other Topics Concern    Not on file   Social History Narrative    Not on file     Social Determinants of Health     Financial Resource Strain: Not on file   Food Insecurity: Not on file   Transportation Needs: Not on file   Physical Activity: Not on file   Stress: Not on file   Social Connections: Not on file   Intimate Partner Violence: Not on file   Housing Stability: Not on file       History reviewed. No pertinent family history. Allergies:    Patient has no known allergies. Home Medications:  Prior to Admission medications    Medication Sig Start Date End Date Taking?  Authorizing Provider   cephALEXin (KEFLEX) 500 MG capsule Take 1 capsule by mouth 2 times daily for 7 days 12/29/22 1/5/23 Yes Josh Chavezellone, DO   acetaminophen (TYLENOL) 500 MG tablet Take 2 tablets by mouth every 6 hours as needed for Pain 12/29/22 1/12/23 Yes Josh Ranellone, DO   ibuprofen (ADVIL;MOTRIN) 800 MG tablet Take 1 tablet by mouth every 8 hours as needed for Pain 12/29/22 1/12/23 Yes Josh Chavezellone, DO   tamsulosin (FLOMAX) 0.4 MG capsule Take 1 capsule by mouth in the morning and at bedtime 12/8/22   Anastasiia Garcia APRN - CNP   ciprofloxacin (CIPRO) 500 MG tablet Take 1 tablet by mouth 2 times daily Take first dose 1 hour prior to prostate biopsy and then twice a day thereafter until completed  Patient not taking: Reported on 12/8/2022 1/11/21   Philip Villa MD   dicyclomine (BENTYL) 10 MG capsule Take 1 capsule by mouth every 6 hours as needed (cramps, abdominal pain)  Patient not taking: Reported on 12/29/2022 11/22/20   Christine Landers,    aspirin 81 MG tablet Take 81 mg by mouth daily  Patient not taking: Reported on 12/29/2022    Historical Provider, MD   triamcinolone (KENALOG) 0.1 % cream Apply topically 2 times daily Apply topically 2 times daily. Patient not taking: Reported on 12/29/2022 3/3/20   Rufina Dakin, PA-C   traMADol (ULTRAM) 50 MG tablet Take 50 mg by mouth every 6 hours as needed for Pain. Patient not taking: Reported on 12/29/2022    Historical Provider, MD   diphenhydrAMINE (BENADRYL) 25 MG tablet Take 25 mg by mouth every 6 hours as needed for Itching  Patient not taking: Reported on 12/29/2022    Historical Provider, MD       REVIEW OF SYSTEMS    (2-9 systems for level 4, 10 or more for level 5)    Review of Systems   Constitutional:  Negative for chills and fever. HENT:  Negative for congestion. Eyes:  Negative for visual disturbance. Respiratory:  Negative for shortness of breath. Cardiovascular:  Negative for chest pain. Gastrointestinal:  Positive for abdominal pain. Negative for constipation, diarrhea, nausea and vomiting. Genitourinary:  Positive for difficulty urinating. Negative for dysuria, flank pain, hematuria, penile discharge, penile pain, penile swelling, scrotal swelling and testicular pain. Musculoskeletal:  Positive for back pain. Skin:  Negative for wound. Neurological:  Negative for weakness, numbness and headaches. PHYSICAL EXAM   (up to 7 for level 4, 8 or more for level 5)    INITIAL VITALS:   /86   Pulse 81   Temp 99.1 °F (37.3 °C) (Oral)   Resp 21   Ht 5' 10\" (1.778 m)   Wt 230 lb (104.3 kg)   SpO2 97%   BMI 33.00 kg/m²   I have reviewed the triage vital signs.   Const: Well nourished, well developed, appears stated age, no acute distress, nontoxic  Eyes: PERRL, no conjunctival injection  HENT: NCAT, Neck supple without meningismus   CV: RRR, Warm, well-perfused extremities  RESP: CTAB, Unlabored respiratory effort  GI: soft, mildly tender to palpation in suprapubic region, non-distended, no masses  MSK: No gross deformities appreciated. Slight discomfort to palpation in parasternal lumbar back. No midline T or L-spine tenderness. Skin: Warm, dry. No rashes  Neuro: Alert, CNs II-XII grossly intact. Sensation and motor function of extremities grossly intact. Psych: Appropriate mood and affect. DIFFERENTIAL  DIAGNOSIS   DDX:  Urinary retention, urinary tract infection, pyelonephritis, BPH, prostatitis    Initial MDM:  58-year-old male with history of BPH on Flomax presents with urinary retention and back pain. Patient states the symptoms have been ongoing for months. States it is slightly worse today than it normally is. Patient contacted urology office and was instructed to come to the emergency department if he was unable to void. Patient states that he is still able to void but has decreased urinary output. Vital signs stable. Bladder scan and ultrasound both showed less than 150 mL in the bladder. We will get CBC, BMP, urinalysis. Will treat pain. Will reevaluate.     PLAN (LABS / IMAGING / EKG):  Orders Placed This Encounter   Procedures    CBC with Auto Differential    Basic Metabolic Panel    Urinalysis with Reflex to Culture    Microscopic Urinalysis       MEDICATIONS ORDERED:  Orders Placed This Encounter   Medications    0.9 % sodium chloride bolus    fentaNYL (SUBLIMAZE) injection 50 mcg    cephALEXin (KEFLEX) capsule 500 mg     Order Specific Question:   Antimicrobial Indications     Answer:   Urinary Tract Infection    acetaminophen (TYLENOL) tablet 1,000 mg    cephALEXin (KEFLEX) 500 MG capsule     Sig: Take 1 capsule by mouth 2 times daily for 7 days     Dispense:  14 capsule     Refill:  0    acetaminophen (TYLENOL) 500 MG tablet     Sig: Take 2 tablets by mouth every 6 hours as needed for Pain Dispense:  112 tablet     Refill:  0    ibuprofen (ADVIL;MOTRIN) 800 MG tablet     Sig: Take 1 tablet by mouth every 8 hours as needed for Pain     Dispense:  42 tablet     Refill:  0         DIAGNOSTIC RESULTS / EMERGENCY DEPARTMENT COURSE / MDM   LAB RESULTS:  Results for orders placed or performed during the hospital encounter of 12/29/22   CBC with Auto Differential   Result Value Ref Range    WBC 10.1 3.5 - 11.3 k/uL    RBC 6.06 (H) 4.21 - 5.77 m/uL    Hemoglobin 17.1 (H) 13.0 - 17.0 g/dL    Hematocrit 51.3 (H) 40.7 - 50.3 %    MCV 84.7 82.6 - 102.9 fL    MCH 28.2 25.2 - 33.5 pg    MCHC 33.3 28.4 - 34.8 g/dL    RDW 12.1 11.8 - 14.4 %    Platelets 799 133 - 844 k/uL    MPV 11.6 8.1 - 13.5 fL    NRBC Automated 0.0 0.0 per 100 WBC    Seg Neutrophils 69 (H) 36 - 65 %    Lymphocytes 19 (L) 24 - 43 %    Monocytes 7 3 - 12 %    Eosinophils % 3 1 - 4 %    Basophils 1 0 - 2 %    Immature Granulocytes 1 (H) 0 %    Segs Absolute 7.05 1.50 - 8.10 k/uL    Absolute Lymph # 1.86 1.10 - 3.70 k/uL    Absolute Mono # 0.72 0.10 - 1.20 k/uL    Absolute Eos # 0.29 0.00 - 0.44 k/uL    Basophils Absolute 0.07 0.00 - 0.20 k/uL    Absolute Immature Granulocyte 0.06 0.00 - 0.30 k/uL   Basic Metabolic Panel   Result Value Ref Range    Glucose 196 (H) 70 - 99 mg/dL    BUN 12 6 - 20 mg/dL    Creatinine 0.75 0.70 - 1.20 mg/dL    Est, Glom Filt Rate >60 >60 mL/min/1.73m2    Calcium 9.3 8.6 - 10.4 mg/dL    Sodium 136 135 - 144 mmol/L    Potassium 4.2 3.7 - 5.3 mmol/L    Chloride 97 (L) 98 - 107 mmol/L    CO2 28 20 - 31 mmol/L    Anion Gap 11 9 - 17 mmol/L   Urinalysis with Reflex to Culture    Specimen: Urine   Result Value Ref Range    Color, UA Yellow Yellow    Turbidity UA Clear Clear    Glucose, Ur NEGATIVE NEGATIVE    Bilirubin Urine NEGATIVE NEGATIVE    Ketones, Urine NEGATIVE NEGATIVE    Specific Gravity, UA 1.016 1.005 - 1.030    Urine Hgb NEGATIVE NEGATIVE    pH, UA 5.5 5.0 - 8.0    Protein, UA TRACE (A) NEGATIVE    Urobilinogen, Urine Normal Normal    Nitrite, Urine NEGATIVE NEGATIVE    Leukocyte Esterase, Urine SMALL (A) NEGATIVE   Microscopic Urinalysis   Result Value Ref Range    WBC, UA 5 TO 10 0 - 5 /HPF    RBC, UA None 0 - 4 /HPF    Casts UA  0 - 8 /LPF     0 TO 2 HYALINE Reference range defined for non-centrifuged specimen. Epithelial Cells UA None 0 - 5 /HPF       Urinalysis concerning for urinary tract infection. We will treat. MDM/EMERGENCY DEPARTMENT COURSE:  Patient urinated in the emergency department independently and without any difficulties. Urinalysis concerning for urinary tract infection. Keflex given in the emergency department. Pain controlled in the emergency department. Patient encouraged follow-up with urologist.  Patient encouraged follow-up primary care physician. Given return precautions. Patient discharged home with patient's wife. Patient and patient's wife understand and agree with the plan. CRITICAL CARE:  Please see Attending Note    FINAL IMPRESSION      1. Acute cystitis without hematuria    2.  Urinary retention          DISPOSITION / PLAN     DISPOSITION Decision To Discharge 12/29/2022 04:15:40 PM    PATIENT REFERRED TO:  [facility]  [address] [place] [address]  [phone]  Go to   If symptoms worsen    DISCHARGE MEDICATIONS:  New Prescriptions    ACETAMINOPHEN (TYLENOL) 500 MG TABLET    Take 2 tablets by mouth every 6 hours as needed for Pain    CEPHALEXIN (KEFLEX) 500 MG CAPSULE    Take 1 capsule by mouth 2 times daily for 7 days    IBUPROFEN (ADVIL;MOTRIN) 800 MG TABLET    Take 1 tablet by mouth every 8 hours as needed for Pain       Marc Zavala DO  Emergency Medicine Resident, PGY 2    (Please note that portions of this note were completed with a voice recognition program.  Efforts were made to edit the dictations but occasionally words are mis-transcribed.)       Marc Zavala DO  Resident  12/29/22 [id number]

## 2022-12-29 NOTE — ED PROVIDER NOTES
[facility]     Emergency Department     Faculty Attestation    I performed a history and physical examination of the patient and discussed management with the resident. I have reviewed and agree with the residents findings including all diagnostic interpretations, and treatment plans as written at the time of my review. Any areas of disagreement are noted on the chart. I was personally present for the key portions of any procedures. I have documented in the chart those procedures where I was not present during the key portions. For Physician Assistant/ Nurse Practitioner cases/documentation I have personally evaluated this patient and have completed at least one if not all key elements of the E/M (history, physical exam, and MDM). Additional findings are as noted. Primary Care Physician: No primary care provider on file. History: This is a 62 y.o. male who presents to the Emergency Department with complaint of increased frequency of urination and decreased urinary output. Patient has a history of BPH and stated he had these issues before and was placed on Flomax. Recently the pain is in the lower back is been increasing with increasing episodes of urinary frequency with small amounts. He denies any fever, chills or sweats. The patient states he called the urologist office today and was told to come the emergency department for further evaluation. The patient states he does have a follow-up appointment the urologist office on January 16, 2023    Physical:   height is 5' 10\" (1.778 m) and weight is 230 lb (104.3 kg). His oral temperature is 99.1 °F (37.3 °C). His blood pressure is 132/86 and his pulse is 81. His respiration is 21 and oxygen saturation is 97%.   Abdomen is soft he does have some mild lower suprapubic tenderness no guarding or rebound    Impression: History of BPH, increased frequency of urination    Plan: CBC, BMP, urinalysis, urology consultation    Bedside ultrasound shows 100 mL of urine in the bladder. Care the patient turned over to Dr. Mcclure Sample at shift change      Medical Decision Making  Differential diagnosis to include urinary retention, acute kidney injury, urinary tract infection    Amount and/or Complexity of Data Reviewed  Labs: ordered. Decision-making details documented in ED Course. Radiology: independent interpretation performed. Decision-making details documented in ED Course. ECG/medicine tests: ordered. Decision-making details documented in ED Course. (Please note that portions of this note were completed with a voice recognition program.  Efforts were made to edit the dictations but occasionally words are mis-transcribed.)    Lillian Daley.  Naomy Bianchi MD, [facility]  Attending Emergency Medicine Physician        Tasha Flores MD  12/29/22 [id number]

## 2022-12-29 NOTE — ED NOTES
The following labs were labeled with appropriate pt sticker and tubed to lab:     [] Blue     [] Lavender   [] on ice  [] Green/yellow  [] Green/black [] on ice  [] Shanna [place]  [] on ice  [] Yellow  [] Red  [] Type/ Screen  [] ABG  [] VBG    [] COVID-19 swab    [] Rapid  [] PCR  [] Flu swab  [] Peds Viral Panel     [x] Urine Sample  [] Fecal Sample  [] Pelvic Cultures  [] Blood Cultures  [] X 2  [] STREP Cultures         Katlyn Shankar RN  12/29/22 2054

## 2022-12-29 NOTE — ED NOTES
The following labs were labeled with appropriate pt sticker and tubed to lab:     [x] Blue     [x] Lavender   [] on ice  [x] Green/yellow  [x] Green/black [] on ice  [] Ladean [place]  [] on ice  [] Yellow  [] Red  [] Type/ Screen  [] ABG  [] VBG    [] COVID-19 swab    [] Rapid  [] PCR  [] Flu swab  [] Peds Viral Panel     [] Urine Sample  [] Fecal Sample  [] Pelvic Cultures  [] Blood Cultures  [] X 2  [] STREP Cultures         Gisel ROSANA Alvarenga  12/29/22 4924

## 2022-12-29 NOTE — DISCHARGE INSTRUCTIONS
You were evaluated in the emergency department for back pain, urinary retention. Your lab work did not show any acute abnormalities. Your urinalysis was concerning for urinary tract infection. You were given antibiotics in the emergency department. Your pain was treated in the emergency department. You were given fluids in the emergency department. Please follow-up with your urologist as soon as possible. Please follow-up with your primary care physician next week. You were given a prescription for an antibiotic, please take this medication as prescribed. Please finish the entire course of antibiotics even if you start to feel better. Please return to the emergency department for any worsening symptoms, worsening back pain, numbness or tingling in your groin, difficulty urinating, difficulty having a bowel movement, chest pain, shortness of breath, or any other questions or concerns.

## 2022-12-29 NOTE — TELEPHONE ENCOUNTER
Patient's spouse called in stating that patient is not doing well and that he would like to know if he is able to get an appointment sooner. Appointment scheduled for patient on 1/16/23. Patient is having pain in his back and frequently urinating but very little coming out. Patient was informed to go to ER if unable to urinate at all or if noticing any clots in urine. Patient was also informed that a message will be sent to Bob Smart as well to update. Patient spouse verbalizes understanding and call was ended.

## 2022-12-29 NOTE — ED TRIAGE NOTES
Pt is here for left side back pain, urinary rentention and  urgency, x 2 months   Pt follows up with Nephrology already, is on Flomax BID. Pt denies blood in urine.    Main concern is the left sided back pain, and low urine stream that becoming worse over the past week

## 2022-12-30 NOTE — ED PROVIDER NOTES
Faculty Sign-Out Attestation  Handoff taken on the following patient from prior Attending Physician: Daltno Louise    I was available and discussed any additional care issues that arose and coordinated the management plans with the resident(s) caring for the patient during my duty period. Any areas of disagreement with residents documentation of care or procedures are noted on the chart. I was personally present for the key portions of any/all procedures during my duty period. I have documented in the chart those procedures where I was not present during the key portions. 60-year-old male with a history of BPH presenting with increased frequency and urgency, passing only dribbles of urine. Was given Flomax by his urologist due to this, but has not improved. Awaiting electrolytes to evaluate renal function. Electrolytes reviewed, creatinine 0.75, no signs of acute kidney injury. UTI to be treated. Patient has urology follow-up already scheduled for 1/16.     Cindy Narvaez MD  Attending Physician        Cindy Narvaez MD  12/30/22 3110

## 2022-12-30 NOTE — TELEPHONE ENCOUNTER
Call spouse and informed of message per [facility]. Alexis Ambrosia understanding. John Garcia also states that patient did go to ER yesterday and was told that there was a small infection and was given antibiotics and told that should be able to hold him over until 01/16/23 appt. John Garcia was informed to contact office if needed prior and that a note will be sent to [facility] to update. Alexis Almonte understanding and call was ended.

## 2022-12-30 NOTE — TELEPHONE ENCOUNTER
Keep cysto with Dr. Denton Dye. Can be scheduled as a nurse visit for PVR possible cath placement. If unable to urinate or flank pain- would be concern for urinary retention and advise ER, as there is no provider in office.

## 2023-01-16 ENCOUNTER — OFFICE VISIT (OUTPATIENT)
Dept: UROLOGY | Age: 58
End: 2023-01-16
Payer: COMMERCIAL

## 2023-01-16 VITALS — HEIGHT: 70 IN | WEIGHT: 230 LBS | BODY MASS INDEX: 32.93 KG/M2 | TEMPERATURE: 98 F

## 2023-01-16 DIAGNOSIS — N40.1 BPH WITH OBSTRUCTION/LOWER URINARY TRACT SYMPTOMS: Primary | ICD-10-CM

## 2023-01-16 DIAGNOSIS — N13.8 BPH WITH OBSTRUCTION/LOWER URINARY TRACT SYMPTOMS: Primary | ICD-10-CM

## 2023-01-16 PROCEDURE — 52000 CYSTOURETHROSCOPY: CPT | Performed by: UROLOGY

## 2023-01-18 ENCOUNTER — TELEPHONE (OUTPATIENT)
Dept: UROLOGY | Age: 58
End: 2023-01-18

## 2023-01-18 NOTE — TELEPHONE ENCOUNTER
denis Garcia @ [facility] 2/2/23 9:45am **STOP BLOOD THINNERS 1/26/23**   PAT @ [facility] 1/23/23 9;00am         Spoke with Deepak West (spouse) procedure info emailed.

## 2023-01-20 NOTE — TELEPHONE ENCOUNTER
Procedure moved up 9:00am, 7:00am arrival, Spoke with Marda Cowden (Spouse), procedure info given verbally.

## 2023-01-23 ENCOUNTER — HOSPITAL ENCOUNTER (OUTPATIENT)
Dept: PREADMISSION TESTING | Age: 58
Discharge: HOME | End: 2023-01-27
Payer: COMMERCIAL

## 2023-01-23 VITALS
TEMPERATURE: 97.5 F | RESPIRATION RATE: 20 BRPM | HEIGHT: 70 IN | SYSTOLIC BLOOD PRESSURE: 128 MMHG | BODY MASS INDEX: 33.5 KG/M2 | OXYGEN SATURATION: 96 % | DIASTOLIC BLOOD PRESSURE: 84 MMHG | HEART RATE: 75 BPM | WEIGHT: 234 LBS

## 2023-01-23 LAB
ANION GAP SERPL CALCULATED.3IONS-SCNC: 12 MMOL/L (ref 9–17)
BUN BLDV-MCNC: 15 MG/DL (ref 6–20)
CHLORIDE BLD-SCNC: 103 MMOL/L (ref 98–107)
CO2: 22 MMOL/L (ref 20–31)
CREAT SERPL-MCNC: 0.84 MG/DL (ref 0.7–1.2)
GFR SERPL CREATININE-BSD FRML MDRD: >60 ML/MIN/1.73M2
GLUCOSE BLD-MCNC: 143 MG/DL (ref 70–99)
HCT VFR BLD CALC: 51.8 % (ref 40.7–50.3)
HEMOGLOBIN: 17.4 G/DL (ref 13–17)
POTASSIUM SERPL-SCNC: 4.4 MMOL/L (ref 3.7–5.3)
SODIUM BLD-SCNC: 137 MMOL/L (ref 135–144)

## 2023-01-23 PROCEDURE — 85018 HEMOGLOBIN: CPT

## 2023-01-23 PROCEDURE — 36415 COLL VENOUS BLD VENIPUNCTURE: CPT

## 2023-01-23 PROCEDURE — 80051 ELECTROLYTE PANEL: CPT

## 2023-01-23 PROCEDURE — 93005 ELECTROCARDIOGRAM TRACING: CPT | Performed by: ANESTHESIOLOGY

## 2023-01-23 PROCEDURE — 82565 ASSAY OF CREATININE: CPT

## 2023-01-23 PROCEDURE — 82947 ASSAY GLUCOSE BLOOD QUANT: CPT

## 2023-01-23 PROCEDURE — 85014 HEMATOCRIT: CPT

## 2023-01-23 PROCEDURE — 87086 URINE CULTURE/COLONY COUNT: CPT

## 2023-01-23 PROCEDURE — 84520 ASSAY OF UREA NITROGEN: CPT

## 2023-01-23 RX ORDER — SODIUM CHLORIDE, SODIUM LACTATE, POTASSIUM CHLORIDE, CALCIUM CHLORIDE 600; 310; 30; 20 MG/100ML; MG/100ML; MG/100ML; MG/100ML
1000 INJECTION, SOLUTION INTRAVENOUS CONTINUOUS
OUTPATIENT
Start: 2023-01-23

## 2023-01-23 NOTE — H&P
History and Physical    Pt Name: Sierra Swain  MRN: [id number]  YOB: 1965  Date of evaluation: 1/23/2023    SUBJECTIVE:   History of Chief Complaint:    Patient presents for PAT appointment. He reports difficulty with urination, symptoms despite medication treatment. He has had prostate biopsy in the past.  He has been diagnosed with BPH with obstruction, has been scheduled for cystoscopy, greenlight laser. Past Medical History    has a past medical history of BPH associated with nocturia, Diverticulitis, Essential hypertension, Knee pain, Tobacco abuse, Under care of team, Wears glasses, and Wellness examination. Past Surgical History   has a past surgical history that includes Appendectomy; Cholecystectomy; colectomy (2009); Colon surgery; Colonoscopy; Anterior cruciate ligament repair (Left); Colonoscopy (05/27/2015); and Prostate biopsy (N/A, 01/27/2021). Medications  Prior to Admission medications    Medication Sig Start Date End Date Taking? Authorizing Provider   tamsulosin (FLOMAX) 0.4 MG capsule Take 1 capsule by mouth in the morning and at bedtime 12/8/22   PARIS uQintana CNP   traMADol (ULTRAM) 50 MG tablet Take 50 mg by mouth every 6 hours as needed for Pain. Historical Provider, MD     Allergies  has No Known Allergies. Family History  family history includes Alcohol Abuse in an other family member; Cancer in his father and mother; Heart Disease in his sister; No Known Problems in his half-brother, half-brother, half-sister, half-sister, half-sister, and sister; Obesity in his sister. Social History   reports that he has been smoking cigarettes. He started smoking about 45 years ago. He has been smoking an average of .5 packs per day. He has never used smokeless tobacco.   has no history on file for alcohol use. reports no history of drug use.   Marital Status   Occupation /    Review of Systems:  CONSTITUTIONAL:   negative for fevers, chills, fatigue and malaise    EYES:   negative for double vision, blurred vision and photophobia    HEENT:   negative for tinnitus, epistaxis and sore throat     RESPIRATORY:   negative for cough, shortness of breath, wheezing     CARDIOVASCULAR:   negative for chest pain, palpitations, syncope, edema     GASTROINTESTINAL:   negative for nausea, vomiting     GENITOURINARY:   See HPI     MUSCULOSKELETAL:   negative for neck or back pain    NEUROLOGICAL:   Negative for weakness and tingling  negative for headaches and dizziness     PSYCHIATRIC:   negative for anxiety         OBJECTIVE:   VITALS:  height is 5' 10\" (1.778 m) and weight is 234 lb (106.1 kg). His temporal temperature is 97.5 °F (36.4 °C). His blood pressure is 128/84 and his pulse is 75. His respiration is 20 and oxygen saturation is 96%. CONSTITUTIONAL:alert & cooperative, no acute distress. Very pleasant, present with wife today. SKIN:  Warm and dry, no rashes on exposed areas of skin   HEAD:  Normocephalic, atraumatic   EYES: EOMs intact. EARS:  Hearing grossly WNL. NOSE:  Nares patent. No rhinorrhea   MOUTH/THROAT:  benign  NECK:good ROM   LUNGS: Clear to auscultation bilaterally, no wheezes. CARDIOVASCULAR: Heart sounds are normal.  Regular rate and rhythm without murmur. ABDOMEN: soft, non tender, non distended. EXTREMITIES: no edema bilateral lower extremities. Testing:   EK23  Labs pending: drawn 2023     IMPRESSIONS:   BPH with obstruction   has a past medical history of BPH associated with nocturia, Diverticulitis, Essential hypertension (2020), Knee pain (), Tobacco abuse, Under care of team (2023), Wears glasses (2023), and Wellness examination (2023).    PLANS:   Cystoscopy, greenlight laser    DONITA Jones PA-C  Electronically signed 2023 at 10:34 AM

## 2023-01-23 NOTE — DISCHARGE INSTRUCTIONS
Preoperative Instructions:    Stop eating solid foods at midnight the night prior to surgery. Stop drinking clear liquids at midnight the night prior to surgery. (Follow bowel prep instructions if instructed by your surgeon.)    Mindy Kumar at the surgery center (Entrance B) by 7:00 on 2/2/2023  (or as directed by your surgeon's office). Please stop any blood thinning medications as directed by your surgeon or prescribing physician. Failure to stop certain medications may interfere with your scheduled surgery. These may include:  Aspirin, Warfarin (Coumadin), Clopidogrel (Plavix), Ibuprofen (Motrin, Advil), Naproxen (Aleve), Meloxicam (Mobic), Celecoxib (Celebrex), Eliquis, Pradaxa, Xarelto, Effient, Fish Oil, Herbal supplements. You may continue the rest of your medications through the night before surgery unless instructed otherwise. Please take only the following medication(s) the day of surgery with a small sip of water:  none    Please use and bring inhalers the day of surgery. Please bring CPAP the day of surgery. PLEASE NOTE:  THE ABOVE (IF ANY) DISCONTINUED MEDS MAY ONLY BE FROM   \"CLEANING UP\" THE MED LIST AND WERE NOT ACTUALLY CANCELLED; SEE CHART FOR DETAILS AND ALWAYS CHECK WITH PRESCRIBING PROVIDER BEFORE DISCONTINUING ANY MEDICATIONS          ____________________________  ____________________________  Signature (Patient)           Signature/date(Provider)      REMINDERS:  ** If you are going home the day of your procedure, you will need a friend or family member to drive you home after your procedure. Your  must be 25years of age or older and able to sign off on your discharge instructions. Taxi cabs or any form of public transportation is not acceptable. ** It is preferable that the friend or family member stay at the hospital throughout your procedure.   ** If you are going home the same day as your procedure, someone must remain with you for the first 24 hours after your surgery if you receive anesthesia or sedation. If you do not have someone to stay with you, your procedure may be cancelled. **  Please do not wear any jewelry or body piercings the day of surgery. PREPARING FOR YOUR SURGERY:     Before surgery, you can play an important role in your own health. Because skin is not sterile, we need to be sure that your skin is as free of germs as possible before surgery by carefully washing before surgery. Preparing or prepping skin before surgery can reduce the risk of a surgical site infection.   Do not shave the area of your body where your surgery will be performed unless you received specific permission from your physician. You will need to shower at home the night before surgery and the morning of surgery with a special soap called chlorhexidine gluconate (CHG*). *Not to be used by people allergic to Chlorhexidine Gluconate (CHG). Following these instructions will help you be sure that your skin is clean before surgery. Instructions on cleaning your skin before surgery: The night before your surgery: You will need to shower with warm water (not hot) and the CHG soap. Use a clean wash cloth and a clean towel. Have clean clothes available to put on after the shower. First wash your hair with regular shampoo. Rinse your hair and body thoroughly to remove the shampoo. Wash your face with your regular soap or water only. Thoroughly rinse your body with warm water from the neck down. Turn water off to prevent rinsing the soap off too soon. With a clean wet washcloth and half of the CHG soap in the bottle, lather your entire body from the neck down. Do not use CHG soap near your eyes or ears to avoid injury to those areas. Wash thoroughly, paying special attention to the area where your surgery will be performed. Wash your body gently for five (5) minutes. Avoid scrubbing your skin too hard.   Turn the water back on and rinse your body thoroughly. Pat yourself dry with a clean, soft towel. Do not apply lotion, cream or powder. Dress with clean freshly washed clothes. The morning of surgery:    Repeat shower following steps above  - using remaining half of CHG soap in bottle. If you have any questions, call the Pre-Admission Testing Unit at [phone]. Day of Surgery/Procedure    As a patient at [facility] you can expect quality medical and nursing care that is centered on your individual needs. Our goal is to make your surgical experience as comfortable as possible  . Directions to the [address] [place] [address] is located at [address] [place], [address] Please pull into the Emergency [address] [facility] parking lot (Entrance B) and park in that lot. We also have additional parking across the street. You will enter the facility following the [address] sign. Please stop at the reception desk where you will be checked in by the staff. If you have any questions please call [phone]. Transportation after your procedure. You will need a friend or family member to drive you home after your procedure. Your  must be 25years of age or older and able to sign off on your discharge instructions. Taxi cabs or any form of public transportation is not acceptable. It is preferable that the friend or family member stay at the hospital throughout your procedure. Someone must remain at home with you for the first 24 hours after your surgery if you receive anesthesia or sedation. If you do not have someone to stay with you, your procedure may be cancelled. Patient Instructions    If you are having any type of anesthesia you are to have nothing to eat or drink after midnight the night before your surgery.   This includes gum, mints, water or smoking or chewing tobacco.  The only exception to this is a small sip of water to take with any morning dose of heart, blood pressure, or seizure medications. Bring a list of all medications you take, along with the dose of the medications and how often you take it. If more convenient bring the pharmacy bottles in a zip lock bag. Please shower the night before and the morning of surgery with an antibacterial soap. Please use the wipes given to you the night before your surgery after your shower. Unless otherwise told by your physician, please do not shave legs or any part of your body below your neck the night before or day of your surgery. You may shave your face or neck. Brush your teeth but do not swallow water. Bring your inhaler if you are currently using one. Bring your eyeglasses and case with you. No contacts are to be worn the day of surgery. You also may bring your hearing aids. Bring your blood band if one has been given to you. Please do not close the clasp. If you are on C-PAP or Bi-PAP at home and plan on staying in the hospital overnight for your surgery please bring the machine with you. Do not wear any jewelry or body piercings day of surgery. Also, NO lotion, perfume or deodorant to be used the day of surgery. Do not bring any valuables, such as jewelry, cash or credit cards. If you are staying overnight with us, please bring a SMALL bag of personal items. We cannot accommodate large items, like suitcases. Please wear loose, comfortable clothing. If you are potentially going to have a cast or brace bring clothing that will fit over them.                                                                                                                           In case of illness - If you have cold or flu like symptoms (high fever, runny nose, sore throat, cough, etc.) rash, nausea, vomiting, loose stools, and/or recent contact with someone who has a contagious disease (chicken pox, measles, etc.) Please call your doctor before coming to the hospital.      If your child is having surgery please make arrangements for any other children to be cared for at home on the day of surgery. Other children are not permitted in recovery room and we want you to be able to spend time with the patient. If other arrangements are not available then we suggest that you have a second adult to stay in the waiting room.       If you have any other questions regarding your procedure or the day of surgery, please call [phone], or [phone]

## 2023-01-23 NOTE — H&P (VIEW-ONLY)
History and Physical    Pt Name: Tammie Tomas  MRN: [id number]  YOB: 1965  Date of evaluation: 1/23/2023    SUBJECTIVE:   History of Chief Complaint:    Patient presents for PAT appointment. He reports difficulty with urination, symptoms despite medication treatment. He has had prostate biopsy in the past.  He has been diagnosed with BPH with obstruction, has been scheduled for cystoscopy, greenlight laser. Past Medical History    has a past medical history of BPH associated with nocturia, Diverticulitis, Essential hypertension, Knee pain, Tobacco abuse, Under care of team, Wears glasses, and Wellness examination. Past Surgical History   has a past surgical history that includes Appendectomy; Cholecystectomy; colectomy (2009); Colon surgery; Colonoscopy; Anterior cruciate ligament repair (Left); Colonoscopy (05/27/2015); and Prostate biopsy (N/A, 01/27/2021). Medications  Prior to Admission medications    Medication Sig Start Date End Date Taking? Authorizing Provider   tamsulosin (FLOMAX) 0.4 MG capsule Take 1 capsule by mouth in the morning and at bedtime 12/8/22   PARIS Pierre CNP   traMADol (ULTRAM) 50 MG tablet Take 50 mg by mouth every 6 hours as needed for Pain. Historical Provider, MD     Allergies  has No Known Allergies. Family History  family history includes Alcohol Abuse in an other family member; Cancer in his father and mother; Heart Disease in his sister; No Known Problems in his half-brother, half-brother, half-sister, half-sister, half-sister, and sister; Obesity in his sister. Social History   reports that he has been smoking cigarettes. He started smoking about 45 years ago. He has been smoking an average of .5 packs per day. He has never used smokeless tobacco.   has no history on file for alcohol use. reports no history of drug use.   Marital Status   Occupation /    Review of Systems:  CONSTITUTIONAL:   negative for fevers, chills, fatigue and malaise    EYES:   negative for double vision, blurred vision and photophobia    HEENT:   negative for tinnitus, epistaxis and sore throat     RESPIRATORY:   negative for cough, shortness of breath, wheezing     CARDIOVASCULAR:   negative for chest pain, palpitations, syncope, edema     GASTROINTESTINAL:   negative for nausea, vomiting     GENITOURINARY:   See HPI     MUSCULOSKELETAL:   negative for neck or back pain    NEUROLOGICAL:   Negative for weakness and tingling  negative for headaches and dizziness     PSYCHIATRIC:   negative for anxiety         OBJECTIVE:   VITALS:  height is 5' 10\" (1.778 m) and weight is 234 lb (106.1 kg). His temporal temperature is 97.5 °F (36.4 °C). His blood pressure is 128/84 and his pulse is 75. His respiration is 20 and oxygen saturation is 96%. CONSTITUTIONAL:alert & cooperative, no acute distress. Very pleasant, present with wife today. SKIN:  Warm and dry, no rashes on exposed areas of skin   HEAD:  Normocephalic, atraumatic   EYES: EOMs intact. EARS:  Hearing grossly WNL. NOSE:  Nares patent. No rhinorrhea   MOUTH/THROAT:  benign  NECK:good ROM   LUNGS: Clear to auscultation bilaterally, no wheezes. CARDIOVASCULAR: Heart sounds are normal.  Regular rate and rhythm without murmur. ABDOMEN: soft, non tender, non distended. EXTREMITIES: no edema bilateral lower extremities. Testing:   EK23  Labs pending: drawn 2023     IMPRESSIONS:   BPH with obstruction   has a past medical history of BPH associated with nocturia, Diverticulitis, Essential hypertension (2020), Knee pain (), Tobacco abuse, Under care of team (2023), Wears glasses (2023), and Wellness examination (2023).    PLANS:   Cystoscopy, greenlight laser    DONITA Schrader PA-C  Electronically signed 2023 at 10:34 AM

## 2023-01-23 NOTE — PROGRESS NOTES
Anesthesia Focused Assessment    Has patient ever tested positive for COVID? No    STOP-BANG Sleep Apnea Questionnaire    SNORE loudly (heard through closed doors)? No  TIRED, fatigued, sleepy during daytime? No  OBSERVED stopping breathing during sleep? No  High blood PRESSURE being treated? Yes    BMI over 35? No  AGE over 48? Yes  NECK circumference over 16\"? No  GENDER (male)? Yes             Total 3  High risk 5-8  Intermediate risk 3-4  Low risk 0-2    Obstructive Sleep Apnea: denies  If YES, machine used: no     Type 1 DM:   no  T2DM:  no    Coronary Artery Disease:  no  Hypertension:  yes    Active smoker:  1 PPD for 40 years. Drinks Alcohol:  rarely    Dentition: molar crown    Defib / AICD / Pacemaker: no      Renal Failure/dialysis:  no    Patient was evaluated in PAT & anesthesia guidelines were applied. NPO guidelines, medication instructions and scheduled arrival time were reviewed with patient. I advised patient to please contact the surgeon's office, ahead of time if possible, if any new signs or symptoms of illness, infection, rash, etc    Hx of anesthesia complications:  no  Family hx of anesthesia complications:  no                                                                                                                     Anesthesia contacted:   no  Medical or cardiac clearance ordered: no    Patient is active without cardiac or pulmonary complaints. He has appointment today with PCP for clearance. Will request copy for chart.     Shavon Mcclain PA-C  1/23/23  9:10 AM

## 2023-01-24 LAB
CULTURE: NORMAL
EKG ATRIAL RATE: 78 BPM
EKG P AXIS: 76 DEGREES
EKG P-R INTERVAL: 138 MS
EKG Q-T INTERVAL: 356 MS
EKG QRS DURATION: 86 MS
EKG QTC CALCULATION (BAZETT): 405 MS
EKG R AXIS: 90 DEGREES
EKG T AXIS: 65 DEGREES
EKG VENTRICULAR RATE: 78 BPM
SPECIMEN DESCRIPTION: NORMAL

## 2023-01-24 PROCEDURE — 93010 ELECTROCARDIOGRAM REPORT: CPT | Performed by: INTERNAL MEDICINE

## 2023-01-25 ENCOUNTER — TELEPHONE (OUTPATIENT)
Dept: UROLOGY | Age: 58
End: 2023-01-25

## 2023-01-25 NOTE — TELEPHONE ENCOUNTER
Janeth Lindsay called states that there is paperwork for patients FMLA/disability for his surgical intervention, she will drop off at off at office on 1/26/23. Janeth Lindsay is aware that Dr. Mamie Duverney isn't in the office until Monday, paperwork will be ready by Monday afternoon/Tuesday morning and office will call. Janeth Lindsay voiced understanding.

## 2023-01-26 ENCOUNTER — TELEPHONE (OUTPATIENT)
Dept: UROLOGY | Age: 58
End: 2023-01-26

## 2023-01-26 NOTE — TELEPHONE ENCOUNTER
Patient's wife came into the office 1/26/2023 and dropped off [place] paperwork, scanned into patients chart, gave to [facility].

## 2023-02-02 ENCOUNTER — HOSPITAL ENCOUNTER (OUTPATIENT)
Age: 58
Setting detail: OUTPATIENT SURGERY
Discharge: HOME | End: 2023-02-02
Attending: UROLOGY | Admitting: UROLOGY
Payer: COMMERCIAL

## 2023-02-02 ENCOUNTER — ANESTHESIA (OUTPATIENT)
Dept: OPERATING ROOM | Age: 58
End: 2023-02-02
Payer: COMMERCIAL

## 2023-02-02 ENCOUNTER — ANESTHESIA EVENT (OUTPATIENT)
Dept: OPERATING ROOM | Age: 58
End: 2023-02-02
Payer: COMMERCIAL

## 2023-02-02 VITALS
OXYGEN SATURATION: 92 % | SYSTOLIC BLOOD PRESSURE: 123 MMHG | HEART RATE: 63 BPM | WEIGHT: 233 LBS | HEIGHT: 69 IN | TEMPERATURE: 97.5 F | BODY MASS INDEX: 34.51 KG/M2 | DIASTOLIC BLOOD PRESSURE: 85 MMHG | RESPIRATION RATE: 22 BRPM

## 2023-02-02 DIAGNOSIS — G89.18 POST-OP PAIN: Primary | ICD-10-CM

## 2023-02-02 PROCEDURE — 3600000004 HC SURGERY LEVEL 4 BASE: Performed by: UROLOGY

## 2023-02-02 PROCEDURE — 7100000010 HC PHASE II RECOVERY - FIRST 15 MIN: Performed by: UROLOGY

## 2023-02-02 PROCEDURE — 3700000000 HC ANESTHESIA ATTENDED CARE: Performed by: UROLOGY

## 2023-02-02 PROCEDURE — 3600000014 HC SURGERY LEVEL 4 ADDTL 15MIN: Performed by: UROLOGY

## 2023-02-02 PROCEDURE — 2580000003 HC RX 258: Performed by: ANESTHESIOLOGY

## 2023-02-02 PROCEDURE — 7100000011 HC PHASE II RECOVERY - ADDTL 15 MIN: Performed by: UROLOGY

## 2023-02-02 PROCEDURE — 2580000003 HC RX 258: Performed by: UROLOGY

## 2023-02-02 PROCEDURE — 6360000002 HC RX W HCPCS: Performed by: ANESTHESIOLOGY

## 2023-02-02 PROCEDURE — 3700000001 HC ADD 15 MINUTES (ANESTHESIA): Performed by: UROLOGY

## 2023-02-02 PROCEDURE — 2580000003 HC RX 258: Performed by: NURSE ANESTHETIST, CERTIFIED REGISTERED

## 2023-02-02 PROCEDURE — 2500000003 HC RX 250 WO HCPCS: Performed by: NURSE ANESTHETIST, CERTIFIED REGISTERED

## 2023-02-02 PROCEDURE — 2709999900 HC NON-CHARGEABLE SUPPLY: Performed by: UROLOGY

## 2023-02-02 PROCEDURE — 7100000001 HC PACU RECOVERY - ADDTL 15 MIN: Performed by: UROLOGY

## 2023-02-02 PROCEDURE — 6360000002 HC RX W HCPCS: Performed by: NURSE ANESTHETIST, CERTIFIED REGISTERED

## 2023-02-02 PROCEDURE — 7100000000 HC PACU RECOVERY - FIRST 15 MIN: Performed by: UROLOGY

## 2023-02-02 PROCEDURE — 6370000000 HC RX 637 (ALT 250 FOR IP): Performed by: PHYSICIAN ASSISTANT

## 2023-02-02 PROCEDURE — 2720000010 HC SURG SUPPLY STERILE: Performed by: UROLOGY

## 2023-02-02 RX ORDER — SODIUM CHLORIDE 9 MG/ML
INJECTION, SOLUTION INTRAVENOUS PRN
Status: DISCONTINUED | OUTPATIENT
Start: 2023-02-02 | End: 2023-02-02 | Stop reason: HOSPADM

## 2023-02-02 RX ORDER — MIDAZOLAM HYDROCHLORIDE 1 MG/ML
INJECTION INTRAMUSCULAR; INTRAVENOUS PRN
Status: DISCONTINUED | OUTPATIENT
Start: 2023-02-02 | End: 2023-02-02 | Stop reason: SDUPTHER

## 2023-02-02 RX ORDER — MAGNESIUM HYDROXIDE 1200 MG/15ML
LIQUID ORAL PRN
Status: DISCONTINUED | OUTPATIENT
Start: 2023-02-02 | End: 2023-02-02 | Stop reason: ALTCHOICE

## 2023-02-02 RX ORDER — SODIUM CHLORIDE 0.9 % (FLUSH) 0.9 %
5-40 SYRINGE (ML) INJECTION EVERY 12 HOURS SCHEDULED
Status: DISCONTINUED | OUTPATIENT
Start: 2023-02-02 | End: 2023-02-02 | Stop reason: HOSPADM

## 2023-02-02 RX ORDER — MAGNESIUM HYDROXIDE 1200 MG/15ML
LIQUID ORAL CONTINUOUS PRN
Status: COMPLETED | OUTPATIENT
Start: 2023-02-02 | End: 2023-02-02

## 2023-02-02 RX ORDER — MIDAZOLAM HYDROCHLORIDE 2 MG/2ML
1 INJECTION, SOLUTION INTRAMUSCULAR; INTRAVENOUS EVERY 10 MIN PRN
Status: DISCONTINUED | OUTPATIENT
Start: 2023-02-02 | End: 2023-02-02 | Stop reason: HOSPADM

## 2023-02-02 RX ORDER — FENTANYL CITRATE 50 UG/ML
50 INJECTION, SOLUTION INTRAMUSCULAR; INTRAVENOUS
Status: DISCONTINUED | OUTPATIENT
Start: 2023-02-02 | End: 2023-02-02 | Stop reason: HOSPADM

## 2023-02-02 RX ORDER — FENTANYL CITRATE 50 UG/ML
INJECTION, SOLUTION INTRAMUSCULAR; INTRAVENOUS PRN
Status: DISCONTINUED | OUTPATIENT
Start: 2023-02-02 | End: 2023-02-02 | Stop reason: SDUPTHER

## 2023-02-02 RX ORDER — CIPROFLOXACIN 500 MG/1
500 TABLET, FILM COATED ORAL 2 TIMES DAILY
Qty: 6 TABLET | Refills: 0 | Status: SHIPPED | OUTPATIENT
Start: 2023-02-02 | End: 2023-02-05

## 2023-02-02 RX ORDER — CEFAZOLIN SODIUM 1 G/3ML
INJECTION, POWDER, FOR SOLUTION INTRAMUSCULAR; INTRAVENOUS PRN
Status: DISCONTINUED | OUTPATIENT
Start: 2023-02-02 | End: 2023-02-02 | Stop reason: SDUPTHER

## 2023-02-02 RX ORDER — FENTANYL CITRATE 50 UG/ML
50 INJECTION, SOLUTION INTRAMUSCULAR; INTRAVENOUS EVERY 5 MIN PRN
Status: DISCONTINUED | OUTPATIENT
Start: 2023-02-02 | End: 2023-02-02 | Stop reason: HOSPADM

## 2023-02-02 RX ORDER — SCOLOPAMINE TRANSDERMAL SYSTEM 1 MG/1
1 PATCH, EXTENDED RELEASE TRANSDERMAL
Status: DISCONTINUED | OUTPATIENT
Start: 2023-02-02 | End: 2023-02-02 | Stop reason: HOSPADM

## 2023-02-02 RX ORDER — FENTANYL CITRATE 50 UG/ML
25 INJECTION, SOLUTION INTRAMUSCULAR; INTRAVENOUS EVERY 5 MIN PRN
Status: DISCONTINUED | OUTPATIENT
Start: 2023-02-02 | End: 2023-02-02 | Stop reason: HOSPADM

## 2023-02-02 RX ORDER — SODIUM CHLORIDE, SODIUM LACTATE, POTASSIUM CHLORIDE, CALCIUM CHLORIDE 600; 310; 30; 20 MG/100ML; MG/100ML; MG/100ML; MG/100ML
INJECTION, SOLUTION INTRAVENOUS CONTINUOUS PRN
Status: DISCONTINUED | OUTPATIENT
Start: 2023-02-02 | End: 2023-02-02 | Stop reason: SDUPTHER

## 2023-02-02 RX ORDER — HYDROCODONE BITARTRATE AND ACETAMINOPHEN 5; 325 MG/1; MG/1
1 TABLET ORAL EVERY 4 HOURS PRN
Qty: 10 TABLET | Refills: 0 | Status: SHIPPED | OUTPATIENT
Start: 2023-02-02 | End: 2023-02-05

## 2023-02-02 RX ORDER — FENTANYL CITRATE 50 UG/ML
25 INJECTION, SOLUTION INTRAMUSCULAR; INTRAVENOUS
Status: DISCONTINUED | OUTPATIENT
Start: 2023-02-02 | End: 2023-02-02 | Stop reason: HOSPADM

## 2023-02-02 RX ORDER — SODIUM CHLORIDE, SODIUM LACTATE, POTASSIUM CHLORIDE, CALCIUM CHLORIDE 600; 310; 30; 20 MG/100ML; MG/100ML; MG/100ML; MG/100ML
1000 INJECTION, SOLUTION INTRAVENOUS CONTINUOUS
Status: DISCONTINUED | OUTPATIENT
Start: 2023-02-02 | End: 2023-02-02 | Stop reason: HOSPADM

## 2023-02-02 RX ORDER — DIPHENHYDRAMINE HYDROCHLORIDE 50 MG/ML
12.5 INJECTION INTRAMUSCULAR; INTRAVENOUS
Status: DISCONTINUED | OUTPATIENT
Start: 2023-02-02 | End: 2023-02-02 | Stop reason: HOSPADM

## 2023-02-02 RX ORDER — SODIUM CHLORIDE 0.9 % (FLUSH) 0.9 %
5-40 SYRINGE (ML) INJECTION PRN
Status: DISCONTINUED | OUTPATIENT
Start: 2023-02-02 | End: 2023-02-02 | Stop reason: HOSPADM

## 2023-02-02 RX ORDER — HYOSCYAMINE SULFATE 0.12 MG/1
0.12 TABLET SUBLINGUAL EVERY 6 HOURS PRN
Qty: 20 EACH | Refills: 0 | Status: SHIPPED | OUTPATIENT
Start: 2023-02-02

## 2023-02-02 RX ORDER — ONDANSETRON 2 MG/ML
4 INJECTION INTRAMUSCULAR; INTRAVENOUS
Status: DISCONTINUED | OUTPATIENT
Start: 2023-02-02 | End: 2023-02-02 | Stop reason: HOSPADM

## 2023-02-02 RX ORDER — DEXAMETHASONE SODIUM PHOSPHATE 10 MG/ML
INJECTION, SOLUTION INTRAMUSCULAR; INTRAVENOUS PRN
Status: DISCONTINUED | OUTPATIENT
Start: 2023-02-02 | End: 2023-02-02 | Stop reason: SDUPTHER

## 2023-02-02 RX ORDER — LIDOCAINE HYDROCHLORIDE 10 MG/ML
INJECTION, SOLUTION EPIDURAL; INFILTRATION; INTRACAUDAL; PERINEURAL PRN
Status: DISCONTINUED | OUTPATIENT
Start: 2023-02-02 | End: 2023-02-02 | Stop reason: SDUPTHER

## 2023-02-02 RX ORDER — LIDOCAINE HYDROCHLORIDE 10 MG/ML
1 INJECTION, SOLUTION INFILTRATION; PERINEURAL
Status: DISCONTINUED | OUTPATIENT
Start: 2023-02-02 | End: 2023-02-02 | Stop reason: HOSPADM

## 2023-02-02 RX ORDER — PROPOFOL 10 MG/ML
INJECTION, EMULSION INTRAVENOUS PRN
Status: DISCONTINUED | OUTPATIENT
Start: 2023-02-02 | End: 2023-02-02 | Stop reason: SDUPTHER

## 2023-02-02 RX ORDER — DOCUSATE SODIUM 100 MG/1
100 CAPSULE, LIQUID FILLED ORAL 2 TIMES DAILY
Qty: 60 CAPSULE | Refills: 0 | Status: SHIPPED | OUTPATIENT
Start: 2023-02-02 | End: 2023-03-04

## 2023-02-02 RX ADMIN — FENTANYL CITRATE 50 MCG: 50 INJECTION, SOLUTION INTRAMUSCULAR; INTRAVENOUS at 09:57

## 2023-02-02 RX ADMIN — FENTANYL CITRATE 25 MCG: 50 INJECTION, SOLUTION INTRAMUSCULAR; INTRAVENOUS at 09:52

## 2023-02-02 RX ADMIN — HYOSCYAMINE SULFATE 125 MCG: 0.12 TABLET ORAL at 10:53

## 2023-02-02 RX ADMIN — MIDAZOLAM 2 MG: 1 INJECTION INTRAMUSCULAR; INTRAVENOUS at 08:34

## 2023-02-02 RX ADMIN — SODIUM CHLORIDE, POTASSIUM CHLORIDE, SODIUM LACTATE AND CALCIUM CHLORIDE 1000 ML: 600; 310; 30; 20 INJECTION, SOLUTION INTRAVENOUS at 08:23

## 2023-02-02 RX ADMIN — PROPOFOL 200 MG: 10 INJECTION, EMULSION INTRAVENOUS at 08:36

## 2023-02-02 RX ADMIN — FENTANYL CITRATE 25 MCG: 50 INJECTION, SOLUTION INTRAMUSCULAR; INTRAVENOUS at 09:18

## 2023-02-02 RX ADMIN — CEFAZOLIN 2 G: 1 INJECTION, POWDER, FOR SOLUTION INTRAMUSCULAR; INTRAVENOUS at 08:47

## 2023-02-02 RX ADMIN — DEXAMETHASONE SODIUM PHOSPHATE 10 MG: 10 INJECTION INTRAMUSCULAR; INTRAVENOUS at 08:41

## 2023-02-02 RX ADMIN — SODIUM CHLORIDE, POTASSIUM CHLORIDE, SODIUM LACTATE AND CALCIUM CHLORIDE: 600; 310; 30; 20 INJECTION, SOLUTION INTRAVENOUS at 08:30

## 2023-02-02 RX ADMIN — LIDOCAINE HYDROCHLORIDE 5 MG: 10 INJECTION, SOLUTION EPIDURAL; INFILTRATION; INTRACAUDAL; PERINEURAL at 08:36

## 2023-02-02 RX ADMIN — FENTANYL CITRATE 50 MCG: 50 INJECTION, SOLUTION INTRAMUSCULAR; INTRAVENOUS at 09:05

## 2023-02-02 RX ADMIN — FENTANYL CITRATE 25 MCG: 50 INJECTION, SOLUTION INTRAMUSCULAR; INTRAVENOUS at 08:40

## 2023-02-02 ASSESSMENT — PAIN SCALES - GENERAL
PAINLEVEL_OUTOF10: 4
PAINLEVEL_OUTOF10: 3
PAINLEVEL_OUTOF10: 3
PAINLEVEL_OUTOF10: 5
PAINLEVEL_OUTOF10: 10
PAINLEVEL_OUTOF10: 0
PAINLEVEL_OUTOF10: 4
PAINLEVEL_OUTOF10: 5
PAINLEVEL_OUTOF10: 5
PAINLEVEL_OUTOF10: 4
PAINLEVEL_OUTOF10: 5
PAINLEVEL_OUTOF10: 6
PAINLEVEL_OUTOF10: 6
PAINLEVEL_OUTOF10: 4

## 2023-02-02 ASSESSMENT — PAIN DESCRIPTION - LOCATION
LOCATION: ABDOMEN

## 2023-02-02 ASSESSMENT — PAIN - FUNCTIONAL ASSESSMENT
PAIN_FUNCTIONAL_ASSESSMENT: 0-10
PAIN_FUNCTIONAL_ASSESSMENT: 0-10

## 2023-02-02 ASSESSMENT — PAIN DESCRIPTION - ORIENTATION
ORIENTATION: MID;LOWER
ORIENTATION: LOWER;MID

## 2023-02-02 ASSESSMENT — PAIN DESCRIPTION - DESCRIPTORS
DESCRIPTORS: PRESSURE
DESCRIPTORS: PRESSURE;BURNING

## 2023-02-02 NOTE — DISCHARGE INSTRUCTIONS
Discharge instructions: Greenlight Photovaporization of the prostate: The patient should have CBI weaned off in recovery. Please call if urine is not clear / pink with CBI. Traction on the catheter should be released before discharge  Contue flomax for 1 month    You may see blood in the urine after the procedure. This should resolve over the next couple days. Please stay hydrated. You may experience frequency/urgency of urination after the procedure. We expect these symptoms to improve over the next couple weeks. Tylenol/Norco for pain control  Patient ok to discharge home in good condition  No heavy lifting, >10 lbs for today  Patient should avoid strenuous activity for today  Patient should walk moderately at home  Patient ok to shower   Patient may resume diet as tolerated  Patient should take Rx as directed  No driving while on narcotics  Please call attending physician or hospital  with questions  Call or Present to ED if fever (> 101F), intractable nausea vomiting or pain. Pt should follow up with Dr. Latanya Rueda office tomorrow to have catheter removed, call to confirm appointment    Home with alejandra catheter. Please teach alejandra education and send home with leg and night bag. You may see intermittent blood in the urine while the catheter in place. If the catheter becomes obstructed and needs to be exchanged, please call. No alcoholic beverages, no driving or operating machinery, no making important decisions for 24 hours. You may have a normal diet but should eat lightly day of surgery. Drink plenty of fluids.

## 2023-02-02 NOTE — PROGRESS NOTES
Discharge instructions reviewed with both patient and spouse. Both verbalize understanding and all questions answered. Pt and spouse showed how to empty alejandra bag and how to change from a standard drainage bad to a leg bag. Wife returned demonstration correctly.

## 2023-02-02 NOTE — PROGRESS NOTES
SHARRI Hogan at bedside evaluating patient. States the red/clear urine is appropriate for DC. Updated patient and family to monitor the color and output of urine at home. Instructed to call the office or come to ER if urine becomes dark red, having clots and or no urine output. Patient and family states understanding.

## 2023-02-02 NOTE — OP NOTE
Operative Note      Patient: Jhoana Carter  YOB: 1965  MRN: [id number]    Date of Procedure: 2/2/2023    Pre-Op Diagnosis: BPH WITH OBSTRUCTION    Post-Op Diagnosis: Same       Procedure: Greenlight XPS laser photo vaporization of the prostate    Surgeon(s):  Jorge Gonzalez MD    Assistant:   * No surgical staff found *    Anesthesia: General    Estimated Blood Loss (mL): Minimal    Complications: None    Specimens:   * No specimens in log *    Implants:  * No implants in log *      Drains:   Urinary Catheter 02/02/23 3 Way (Active)   Catheter Indications Perioperative use for selected surgical procedures 02/02/23 0935   Site Assessment Other (Comment) 02/02/23 0935   Urine Color Yellow 02/02/23 0935   Urine Appearance Clear 02/02/23 0935   Collection Container Leg bag 02/02/23 0935   Securement Method Leg strap 02/02/23 0935   Catheter Best Practices  Drainage tube clipped to bed;Catheter secured to thigh; Bag below bladder;Bag not on floor; Lack of dependent loop in tubing;Drainage bag less than half full 02/02/23 0935   Status Draining;Continuous bladder irrigation 02/02/23 0935       Findings: Successful vaporization of the prostate to capsule circumferentially    Detailed Description of Procedure: The patient was brought to the operating room. He was prepped and identified. He was administered a general anesthetic and placed in modified dorsolithotomy position. He was prepped and draped in sterile fashion. A rigid cystoscope was introduced into the bladder. The visual obturator was exchanged for the working bridge and then a greenlight XPS laser fiber was used to vaporize the prostate starting at the bladder neck all the way to the apex. Settings of [de-identified] W were used at the bladder neck and apex in settings of 26 W were used in the heart of the gland. The patient had a nice channel after the prostate was circumferentially vaporized to the capsule.   The ureteral orifice ease and sphincter were frequently assessed before during and after the procedure. Once the vaporization was completed, the scope was removed and a 22 [address] three-way Gaines catheter was placed. Continuous bladder irrigation was initiated. The procedure was terminated. The patient tolerated the procedure well. The plan for the patient is to be discharged home with a catheter. He will follow-up tomorrow for a voiding trial in the office.     Electronically signed by Ethel Heimlich, MD on 2/2/2023 at 11:38 AM

## 2023-02-02 NOTE — ANESTHESIA POSTPROCEDURE EVALUATION
Department of Anesthesiology  Postprocedure Note    Patient: Monalisa Ramos  MRN: [id number]  YOB: 1965  Date of evaluation: 2/2/2023      Procedure Summary     Date: 02/02/23 Room / Location: [address]    Anesthesia Start: 0830 Anesthesia Stop: 5844    Procedure: CYSTOSCOPY GREENLIGHT PVP Carlin Simpler CONF #[id number] Durene Dandy) Diagnosis:       BPH with obstruction/lower urinary tract symptoms      (BPH WITH OBSTRUCTION)    Surgeons: Reynold Sethi MD Responsible Provider: Judd Kirkland MD    Anesthesia Type: general ASA Status: 2          Anesthesia Type: No value filed.     Charlene Phase I: Charlene Score: 8    Charlene Phase II:        Anesthesia Post Evaluation    Patient location during evaluation: PACU  Patient participation: complete - patient participated  Level of consciousness: awake  Pain score: 1  Airway patency: patent  Nausea & Vomiting: no nausea and no vomiting  Complications: no  Cardiovascular status: blood pressure returned to baseline and hemodynamically stable  Respiratory status: acceptable  Hydration status: euvolemic

## 2023-02-02 NOTE — INTERVAL H&P NOTE
Update History & Physical    The patient's History and Physical of January 23, 2023 was reviewed with the patient and I examined the patient. There was no change. The surgical site was confirmed by the patient and me. Plan: The risks, benefits, expected outcome, and alternative to the recommended procedure have been discussed with the patient. Patient understands and wants to proceed with the procedure.      Electronically signed by Emerita Arrington MD on 2/2/2023 at 7:31 AM

## 2023-02-02 NOTE — ANESTHESIA PRE PROCEDURE
Department of Anesthesiology  Preprocedure Note       Name:  Leticia Escobar   Age:  62 y.o.  :  1965                                          MRN:  [id number]         Date:  2023      Surgeon: Claudio Garcias):  Ethel Heimlich, MD    Procedure: Procedure(s):  CYSTOSCOPY GREENLIGHT PVP Latanya Sample CONF #[id number] Liliana Jacob)    Medications prior to admission:   Prior to Admission medications    Medication Sig Start Date End Date Taking? Authorizing Provider   tamsulosin (FLOMAX) 0.4 MG capsule Take 1 capsule by mouth in the morning and at bedtime 22   PARIS Honeycutt - CNP   traMADol (ULTRAM) 50 MG tablet Take 50 mg by mouth every 6 hours as needed for Pain. Historical Provider, MD       Current medications:    No current outpatient medications on file. No current facility-administered medications for this visit. Allergies:  No Known Allergies    Problem List:    Patient Active Problem List   Diagnosis Code    Rash in adult R21    Essential hypertension I10    Elevated fasting glucose R73.01    Elevated PSA R97.20    Tobacco abuse Z72.0    S/P cholecystectomy Z90.49    S/P appendectomy Z90.49    S/P left hemicolectomy Z90.49    Diverticulitis K57.92       Past Medical History:        Diagnosis Date    BPH associated with nocturia     Diverticulitis     Essential hypertension 2020    denies, no PCP    Knee pain 1989    Left - multiple treatments    Tobacco abuse     Under care of team 2023    UROLOGY - DR. Patricia Sullivan - last visit 2023    Wears glasses 2023    States has prescription but does not wear.  Wellness examination 2023    PCP -DR. Ignacio Avalos - appt 2023       Past Surgical History:        Procedure Laterality Date    ANTERIOR CRUCIATE LIGAMENT REPAIR Left     APPENDECTOMY      CHOLECYSTECTOMY      COLECTOMY  2009    colostomy for ruptured bowel    COLON SURGERY      colostomy reversal    COLONOSCOPY      multiple    COLONOSCOPY 05/27/2015    PROSTATE BIOPSY N/A 01/27/2021    FUSION PROSTATE BIOPSY, OFFICE NOTIFYING ULTRASOUND performed by Alvina Oliveira MD at [address] History:    Social History     Tobacco Use    Smoking status: Every Day     Packs/day: 0.50     Types: Cigarettes     Start date: 1978    Smokeless tobacco: Never   Substance Use Topics    Alcohol use: Not on file     Comment: no                                Ready to quit: Not Answered  Counseling given: Not Answered      Vital Signs (Current): There were no vitals filed for this visit.                                            BP Readings from Last 3 Encounters:   01/23/23 128/84   12/29/22 (!) 141/78   03/14/22 137/82       NPO Status:                                                                                 BMI:   Wt Readings from Last 3 Encounters:   01/23/23 234 lb (106.1 kg)   01/16/23 230 lb (104.3 kg)   12/29/22 230 lb (104.3 kg)     There is no height or weight on file to calculate BMI.    CBC:   Lab Results   Component Value Date/Time    WBC 10.1 12/29/2022 03:14 PM    RBC 6.06 12/29/2022 03:14 PM    RBC 5.68 02/13/2012 09:12 AM    HGB 17.4 01/23/2023 10:21 AM    HCT 51.8 01/23/2023 10:21 AM    MCV 84.7 12/29/2022 03:14 PM    RDW 12.1 12/29/2022 03:14 PM     12/29/2022 03:14 PM     02/13/2012 09:12 AM       CMP:   Lab Results   Component Value Date/Time     01/23/2023 10:21 AM    K 4.4 01/23/2023 10:21 AM     01/23/2023 10:21 AM    CO2 22 01/23/2023 10:21 AM    BUN 15 01/23/2023 10:21 AM    CREATININE 0.84 01/23/2023 10:21 AM    GFRAA >60 11/22/2020 08:56 PM    LABGLOM >60 01/23/2023 10:21 AM    GLUCOSE 143 01/23/2023 10:21 AM    PROT 7.7 11/22/2020 08:56 PM    CALCIUM 9.3 12/29/2022 03:14 PM    BILITOT 0.41 11/22/2020 08:56 PM    ALKPHOS 84 11/22/2020 08:56 PM    AST 19 11/22/2020 08:56 PM    ALT 22 11/22/2020 08:56 PM       POC Tests: No results for input(s): POCGLU, POCNA, POCK, POCCL, POCBUN, POCHEMO, POCHCT in the last 72 hours. Coags: No results found for: PROTIME, INR, APTT    HCG (If Applicable): No results found for: PREGTESTUR, PREGSERUM, HCG, HCGQUANT     ABGs: No results found for: PHART, PO2ART, JOW1LEC, UUN6BZU, BEART, I8STRUST     Type & Screen (If Applicable):  No results found for: LABABO, LABRH    Drug/Infectious Status (If Applicable):  No results found for: HIV, HEPCAB    COVID-19 Screening (If Applicable):   Lab Results   Component Value Date/Time    COVID19 Not Detected 01/23/2021 04:00 AM         Anesthesia Evaluation  Patient summary reviewed no history of anesthetic complications:   Airway: Mallampati: II  TM distance: >3 FB   Neck ROM: full  Mouth opening: > = 3 FB   Dental:          Pulmonary:Negative Pulmonary ROS and normal exam                               Cardiovascular:    (+) hypertension: no interval change,       ECG reviewed  Rhythm: regular  Rate: normal                    Neuro/Psych:   Negative Neuro/Psych ROS              GI/Hepatic/Renal: Neg GI/Hepatic/Renal ROS            Endo/Other: Negative Endo/Other ROS                    Abdominal:             Vascular: negative vascular ROS. Other Findings:             Anesthesia Plan      general     ASA 2       Induction: intravenous. Anesthetic plan and risks discussed with patient. Plan discussed with CRNA.               Narrative & Impression    Sinus rhythm with Premature atrial complexes  Rightward axis  Borderline ECG  No previous ECGs available      Specimen Collected: 01/23/23 10:57 CHRISTOPHER Lee MD   2/2/2023

## 2023-02-02 NOTE — PROGRESS NOTES
Notified SHARRI Cha patients urine continues to be red/clear after weaning off the CBI. States she will come evaluate patient.

## 2023-02-03 ENCOUNTER — PROCEDURE VISIT (OUTPATIENT)
Dept: UROLOGY | Age: 58
End: 2023-02-03

## 2023-02-03 VITALS
HEART RATE: 76 BPM | HEIGHT: 69 IN | BODY MASS INDEX: 34.51 KG/M2 | DIASTOLIC BLOOD PRESSURE: 84 MMHG | WEIGHT: 233 LBS | SYSTOLIC BLOOD PRESSURE: 130 MMHG

## 2023-02-03 DIAGNOSIS — N13.8 BPH WITH OBSTRUCTION/LOWER URINARY TRACT SYMPTOMS: Primary | ICD-10-CM

## 2023-02-03 DIAGNOSIS — N40.1 BPH WITH OBSTRUCTION/LOWER URINARY TRACT SYMPTOMS: Primary | ICD-10-CM

## 2023-02-03 PROCEDURE — 99999 PR OFFICE/OUTPT VISIT,PROCEDURE ONLY: CPT | Performed by: UROLOGY

## 2023-02-24 ENCOUNTER — TELEPHONE (OUTPATIENT)
Dept: UROLOGY | Age: 58
End: 2023-02-24

## 2023-02-24 NOTE — TELEPHONE ENCOUNTER
Patient's wife called into the office, stated that patient needed a return to work note dated 3/6/2023, but his appointment wasn't until March 13, 2023. Writer advised a letter could be typed and faxed to patient's employer. She gave the fax number [phone]. Writer relayed message to Mel, letter was typed and faxed.

## 2023-03-13 ENCOUNTER — OFFICE VISIT (OUTPATIENT)
Dept: UROLOGY | Age: 58
End: 2023-03-13
Payer: COMMERCIAL

## 2023-03-13 VITALS — HEIGHT: 69 IN | WEIGHT: 233 LBS | BODY MASS INDEX: 34.51 KG/M2

## 2023-03-13 DIAGNOSIS — N40.1 BPH WITH OBSTRUCTION/LOWER URINARY TRACT SYMPTOMS: Primary | ICD-10-CM

## 2023-03-13 DIAGNOSIS — N13.8 BPH WITH OBSTRUCTION/LOWER URINARY TRACT SYMPTOMS: Primary | ICD-10-CM

## 2023-03-13 LAB — POST VOID RESIDUAL (PVR): NORMAL

## 2023-03-13 PROCEDURE — 51798 US URINE CAPACITY MEASURE: CPT | Performed by: UROLOGY

## 2023-03-13 PROCEDURE — 99024 POSTOP FOLLOW-UP VISIT: CPT | Performed by: UROLOGY

## 2023-03-13 ASSESSMENT — ENCOUNTER SYMPTOMS
BACK PAIN: 0
NAUSEA: 0
GASTROINTESTINAL NEGATIVE: 1
WHEEZING: 0
SHORTNESS OF BREATH: 0
CONSTIPATION: 0
DIARRHEA: 0
COUGH: 0
EYES NEGATIVE: 1
VOMITING: 0
ABDOMINAL PAIN: 0
RESPIRATORY NEGATIVE: 1
EYE PAIN: 0
EYE REDNESS: 0

## 2023-03-13 NOTE — PROGRESS NOTES
[address] [address]  Dept:  [facility] Urology Office Note - Established    Patient:  Danielle Musa  YOB: 1965  Date: 3/13/2023    The patient is a 62 y.o. male who presents todayfor evaluation of the following problems:   Chief Complaint   Patient presents with    Post-Op Check     6 week s/p greenlight- OBTAIN PVR       HPI  This is a 55-year-old gentleman who recently had a greenlight. He is doing well. His stream is stronger and he is emptying much better. Summary of old records: N/A    Additional History: N/A    Procedures Today: N/A    Urinalysis today:  No results found for this visit on 03/13/23. Last several PSA's:  Lab Results   Component Value Date    PSA 7.19 (H) 09/12/2022    PSA 9.78 (H) 01/31/2022    PSA 7.38 (H) 08/09/2021     Last total testosterone:  No results found for: TESTOSTERONE    AUA Symptom Score (3/13/2023): Last BUN and creatinine:  Lab Results   Component Value Date    BUN 15 01/23/2023     Lab Results   Component Value Date    CREATININE 0.84 01/23/2023       Additional Lab/Culture results: none    Imaging Reviewed during this Office Visit: none  (results were independently reviewed by physician and radiology report verified)    PAST MEDICAL, FAMILY AND SOCIAL HISTORY UPDATE:  Past Medical History:   Diagnosis Date    BPH associated with nocturia     Diverticulitis     Essential hypertension 03/04/2020    denies, no PCP    Knee pain 1989    Left - multiple treatments    Tobacco abuse     Under care of team 01/23/2023    UROLOGY - DR. Esme Harvey - last visit 1/2023    Wears glasses 01/23/2023    States has prescription but does not wear. Wellness examination 01/23/2023    PCP -DR. Linda Pink - appt 1/23/2023     Past Surgical History:   Procedure Laterality Date    ANTERIOR CRUCIATE LIGAMENT REPAIR Left APPENDECTOMY      CHOLECYSTECTOMY      COLECTOMY  2009    colostomy for ruptured bowel    COLON SURGERY      colostomy reversal    COLONOSCOPY      multiple    COLONOSCOPY  05/27/2015    PROSTATE BIOPSY N/A 01/27/2021    FUSION PROSTATE BIOPSY, OFFICE NOTIFYING ULTRASOUND performed by Liv Stroud MD at [facility]    TURP N/A 2/2/2023    CYSTOSCOPY GREENLIGHT PVP ([address] URRUTIA #[id number] Sivan Noe) performed by Liv Stroud MD at [address] [place] [address] History   Problem Relation Age of Onset    Cancer Mother         Brain    Cancer Father         Brain    Obesity Sister     Heart Disease Sister     No Known Problems Sister     Alcohol Abuse Other     No Known Problems Half-Sister     No Known Problems Half-Sister     No Known Problems Half-Sister     No Known Problems Half-Brother     No Known Problems Half-Brother      Outpatient Medications Marked as Taking for the 3/13/23 encounter (Office Visit) with Liv Stroud MD   Medication Sig Dispense Refill    Hyoscyamine Sulfate SL (LEVSIN/SL) 0.125 MG SUBL Place 0.125 mg under the tongue every 6 hours as needed (BLadder spasms/alejandra discomfort) 20 each 0    tamsulosin (FLOMAX) 0.4 MG capsule Take 1 capsule by mouth in the morning and at bedtime 180 capsule 3    traMADol (ULTRAM) 50 MG tablet Take 50 mg by mouth every 6 hours as needed for Pain. Patient has no known allergies. Social History     Tobacco Use   Smoking Status Every Day    Packs/day: 0.50    Types: Cigarettes    Start date: 1978   Smokeless Tobacco Never     (Ifpatient a smoker, smoking cessation counseling offered)    Social History     Substance and Sexual Activity   Alcohol Use None    Comment: no       REVIEW OF SYSTEMS:  Review of Systems    Physical Exam:    There were no vitals filed for this visit. Body mass index is 34.41 kg/m². Patient is a 62 y.o. male in no acute distress and alert and oriented to person, place and time.   Physical Exam  Constitutional: Patient in no acute distress. Neuro: Alert and oriented to person, place and time. Psych: Mood normal, affect normal  Skin: No rash noted      Assessment and Plan      1. BPH with obstruction/lower urinary tract symptoms           Plan:   F/u 3 mo bladder scan  Will repeat psa in 6 mo      Return in about 3 months (around 6/13/2023) for bladder scan. Prescriptions Ordered:  No orders of the defined types were placed in this encounter. Orders Placed:  Orders Placed This Encounter   Procedures    [place] RILEY POST-VOIDING RESIDUAL URINE&/BLADDER CAP           Noreen Chacon MD    Agree with the ROS entered by the MA.

## 2023-06-19 ENCOUNTER — OFFICE VISIT (OUTPATIENT)
Dept: UROLOGY | Age: 58
End: 2023-06-19
Payer: COMMERCIAL

## 2023-06-19 VITALS — BODY MASS INDEX: 34.51 KG/M2 | TEMPERATURE: 97.3 F | WEIGHT: 233 LBS | HEIGHT: 69 IN

## 2023-06-19 DIAGNOSIS — N13.8 BPH WITH OBSTRUCTION/LOWER URINARY TRACT SYMPTOMS: ICD-10-CM

## 2023-06-19 DIAGNOSIS — R97.20 ELEVATED PSA: ICD-10-CM

## 2023-06-19 DIAGNOSIS — R35.0 FREQUENCY OF URINATION: Primary | ICD-10-CM

## 2023-06-19 DIAGNOSIS — N40.1 BPH WITH OBSTRUCTION/LOWER URINARY TRACT SYMPTOMS: ICD-10-CM

## 2023-06-19 LAB — POST VOID RESIDUAL (PVR): NORMAL

## 2023-06-19 PROCEDURE — 99213 OFFICE O/P EST LOW 20 MIN: CPT | Performed by: UROLOGY

## 2023-06-19 PROCEDURE — 51798 US URINE CAPACITY MEASURE: CPT | Performed by: UROLOGY

## 2023-06-19 ASSESSMENT — ENCOUNTER SYMPTOMS
NAUSEA: 0
RESPIRATORY NEGATIVE: 1
CONSTIPATION: 0
VOMITING: 0
EYES NEGATIVE: 1
SHORTNESS OF BREATH: 0
BACK PAIN: 0
EYE PAIN: 0
COUGH: 0
WHEEZING: 0
EYE REDNESS: 0
GASTROINTESTINAL NEGATIVE: 1
ABDOMINAL PAIN: 0
DIARRHEA: 0

## 2023-06-19 NOTE — PROGRESS NOTES
[address] [address]  Dept: [address] [facility] Urology Office Note - Established    Patient:  Iglesia Mcneal  YOB: 1965  Date: 6/19/2023    The patient is a 62 y.o. male who presents todayfor evaluation of the following problems:   Chief Complaint   Patient presents with    Benign Prostatic Hypertrophy     3 month PVR       HPI  This is a very pleasant 49-year-old gentleman who had a laser vaporization the prostate about 6 months ago. He is doing much better. His stream is stronger and he is having an easier time emptying his bladder. He has had a persistently elevated PSA with multiple prostate biopsies. These have all been benign. Summary of old records: N/A    Additional History: N/A    Procedures Today: N/A    Urinalysis today:  No results found for this visit on 06/19/23. Last several PSA's:  Lab Results   Component Value Date    PSA 7.19 (H) 09/12/2022    PSA 9.78 (H) 01/31/2022    PSA 7.38 (H) 08/09/2021     Last total testosterone:  No results found for: TESTOSTERONE    AUA Symptom Score (6/19/2023): Last BUN and creatinine:  Lab Results   Component Value Date    BUN 15 01/23/2023     Lab Results   Component Value Date    CREATININE 0.84 01/23/2023       Additional Lab/Culture results: none    Imaging Reviewed during this Office Visit: none  (results were independently reviewed by physician and radiology report verified)    PAST MEDICAL, FAMILY AND SOCIAL HISTORY UPDATE:  Past Medical History:   Diagnosis Date    BPH associated with nocturia     Diverticulitis     Essential hypertension 03/04/2020    denies, no PCP    Knee pain 1989    Left - multiple treatments    Tobacco abuse     Under care of team 01/23/2023    UROLOGY - DR. Mar Chaney - last visit 1/2023    Wears glasses 01/23/2023    States has prescription but does not wear.     Wellness

## 2023-12-13 ENCOUNTER — TELEPHONE (OUTPATIENT)
Dept: UROLOGY | Age: 58
End: 2023-12-13

## 2023-12-13 NOTE — TELEPHONE ENCOUNTER
Writer called patient to remind him to get PSA prior to Monday's appt. Patient's spouse then stated \"I thought Clover Au said he did not want it because Ernesto had the greenlight surgery, it would give inaccurate reading. \"    Writer will speak with Clover Au and follow up. Per  patient does not need to get PSA.

## 2024-01-24 ENCOUNTER — TRANSCRIBE ORDERS (OUTPATIENT)
Dept: ADMINISTRATIVE | Age: 59
End: 2024-01-24

## 2024-01-24 ENCOUNTER — HOSPITAL ENCOUNTER (OUTPATIENT)
Dept: CT IMAGING | Age: 59
Discharge: HOME | End: 2024-01-26
Payer: COMMERCIAL

## 2024-01-24 DIAGNOSIS — K57.32 DIVERTICULITIS, COLON: ICD-10-CM

## 2024-01-24 DIAGNOSIS — R10.9 ABDOMINAL PAIN, UNSPECIFIED ABDOMINAL LOCATION: ICD-10-CM

## 2024-01-24 DIAGNOSIS — R10.9 ABDOMINAL PAIN, UNSPECIFIED ABDOMINAL LOCATION: Primary | ICD-10-CM

## 2024-01-24 PROCEDURE — 2580000003 HC RX 258: Performed by: INTERNAL MEDICINE

## 2024-01-24 PROCEDURE — 6360000004 HC RX CONTRAST MEDICATION: Performed by: INTERNAL MEDICINE

## 2024-01-24 PROCEDURE — 74177 CT ABD & PELVIS W/CONTRAST: CPT

## 2024-01-24 RX ORDER — SODIUM CHLORIDE 0.9 % (FLUSH) 0.9 %
10 SYRINGE (ML) INJECTION PRN
Status: CANCELLED | OUTPATIENT
Start: 2024-01-24

## 2024-01-24 RX ORDER — SODIUM CHLORIDE 9 MG/ML
INJECTION, SOLUTION INTRAVENOUS ONCE
Status: CANCELLED | OUTPATIENT
Start: 2024-01-24 | End: 2024-01-24

## 2024-01-24 RX ORDER — SODIUM CHLORIDE 0.9 % (FLUSH) 0.9 %
10 SYRINGE (ML) INJECTION PRN
Status: COMPLETED | OUTPATIENT
Start: 2024-01-24 | End: 2024-01-24

## 2024-01-24 RX ORDER — SODIUM CHLORIDE 9 MG/ML
INJECTION, SOLUTION INTRAVENOUS ONCE
Status: COMPLETED | OUTPATIENT
Start: 2024-01-24 | End: 2024-01-24

## 2024-01-24 RX ADMIN — SODIUM CHLORIDE: 9 INJECTION, SOLUTION INTRAVENOUS at 11:44

## 2024-01-24 RX ADMIN — IOPAMIDOL 75 ML: 755 INJECTION, SOLUTION INTRAVENOUS at 11:44

## 2024-01-24 RX ADMIN — SODIUM CHLORIDE, PRESERVATIVE FREE 10 ML: 5 INJECTION INTRAVENOUS at 11:44

## 2024-02-13 ENCOUNTER — HOSPITAL ENCOUNTER (OUTPATIENT)
Age: 59
Discharge: HOME | End: 2024-02-13
Payer: COMMERCIAL

## 2024-02-13 LAB — PSA SERPL-MCNC: 5.61 NG/ML

## 2024-02-13 PROCEDURE — 84153 ASSAY OF PSA TOTAL: CPT

## 2024-02-13 PROCEDURE — 36415 COLL VENOUS BLD VENIPUNCTURE: CPT

## 2024-02-19 ENCOUNTER — OFFICE VISIT (OUTPATIENT)
Dept: UROLOGY | Age: 59
End: 2024-02-19
Payer: COMMERCIAL

## 2024-02-19 VITALS
SYSTOLIC BLOOD PRESSURE: 124 MMHG | TEMPERATURE: 97.3 F | DIASTOLIC BLOOD PRESSURE: 86 MMHG | WEIGHT: 233 LBS | HEIGHT: 69 IN | BODY MASS INDEX: 34.51 KG/M2 | HEART RATE: 78 BPM

## 2024-02-19 DIAGNOSIS — N13.8 BPH WITH OBSTRUCTION/LOWER URINARY TRACT SYMPTOMS: Primary | ICD-10-CM

## 2024-02-19 DIAGNOSIS — R97.20 ELEVATED PSA: ICD-10-CM

## 2024-02-19 DIAGNOSIS — N40.1 BPH WITH OBSTRUCTION/LOWER URINARY TRACT SYMPTOMS: Primary | ICD-10-CM

## 2024-02-19 DIAGNOSIS — R35.0 FREQUENCY OF URINATION: ICD-10-CM

## 2024-02-19 DIAGNOSIS — R39.15 URGENCY OF URINATION: ICD-10-CM

## 2024-02-19 DIAGNOSIS — N52.01 ERECTILE DYSFUNCTION DUE TO ARTERIAL INSUFFICIENCY: ICD-10-CM

## 2024-02-19 LAB — POST VOID RESIDUAL (PVR): 56 ML

## 2024-02-19 PROCEDURE — 51798 US URINE CAPACITY MEASURE: CPT | Performed by: UROLOGY

## 2024-02-19 PROCEDURE — 3074F SYST BP LT 130 MM HG: CPT | Performed by: UROLOGY

## 2024-02-19 PROCEDURE — 99214 OFFICE O/P EST MOD 30 MIN: CPT | Performed by: UROLOGY

## 2024-02-19 PROCEDURE — 3079F DIAST BP 80-89 MM HG: CPT | Performed by: UROLOGY

## 2024-02-19 RX ORDER — SILDENAFIL 100 MG/1
100 TABLET, FILM COATED ORAL DAILY PRN
Qty: 30 TABLET | Refills: 1 | Status: SHIPPED | OUTPATIENT
Start: 2024-02-19

## 2024-02-19 ASSESSMENT — ENCOUNTER SYMPTOMS
SHORTNESS OF BREATH: 0
NAUSEA: 0
ABDOMINAL PAIN: 0
DIARRHEA: 0
RESPIRATORY NEGATIVE: 1
WHEEZING: 0
GASTROINTESTINAL NEGATIVE: 1
EYES NEGATIVE: 1
CONSTIPATION: 0
BACK PAIN: 0
EYE PAIN: 0
EYE REDNESS: 0
VOMITING: 0
COUGH: 0

## 2024-02-19 NOTE — PROGRESS NOTES
[facility] PHYSICIANS [facility] UROLOGY CENTER  [address]  [facility] [address]  Dept: [phone]    [facility] Urology Office Note - Established    Patient:  Ernesto Murphy  YOB: 1965  Date: 2/19/2024    The patient is a 58 y.o. male who presents todayfor evaluation of the following problems:   Chief Complaint   Patient presents with    Benign Prostatic Hypertrophy     2 month PVR + PSA       HPI  This is a 58-year-old gentleman who had a laser vaporization of prostate about a year ago.  He was doing quite well with the exception of urgency and frequency.  We had started him on Myrbetriq.  He is doing remarkably better.  He confesses to being very noncompliant with Myrbetriq.  He takes it 2-3 times per week when he remembers to take it.  Additionally, he has had worsening erectile dysfunction over the last few years.  He has never tried any medications for this.  His PSA is 5.61, which is down from 7.19 before surgery.    Summary of old records: N/A    Additional History: N/A    Procedures Today: N/A    Urinalysis today:  No results found for this visit on 02/19/24.  Last several PSA's:  Lab Results   Component Value Date    PSA 5.61 (H) 02/13/2024    PSA 7.19 (H) 09/12/2022    PSA 9.78 (H) 01/31/2022     Last total testosterone:  No results found for: \"TESTOSTERONE\"    AUA Symptom Score (2/19/2024):                               Last BUN and creatinine:  Lab Results   Component Value Date    BUN 15 01/23/2023     Lab Results   Component Value Date    CREATININE 0.84 01/23/2023       Additional Lab/Culture results: none    Imaging Reviewed during this Office Visit: none  (results were independently reviewed by physician and radiology report verified)    PAST MEDICAL, FAMILY AND SOCIAL HISTORY UPDATE:  Past Medical History:   Diagnosis Date    BPH associated with nocturia     Diverticulitis     Essential hypertension 03/04/2020    denies, no PCP

## 2024-08-13 ENCOUNTER — TELEPHONE (OUTPATIENT)
Dept: UROLOGY | Age: 59
End: 2024-08-13

## 2024-08-14 ENCOUNTER — HOSPITAL ENCOUNTER (OUTPATIENT)
Age: 59
Discharge: HOME | End: 2024-08-14
Payer: COMMERCIAL

## 2024-08-14 DIAGNOSIS — R97.20 ELEVATED PSA: ICD-10-CM

## 2024-08-14 LAB — PSA SERPL-MCNC: 6.5 NG/ML (ref 0–4)

## 2024-08-14 PROCEDURE — 36415 COLL VENOUS BLD VENIPUNCTURE: CPT

## 2024-08-14 PROCEDURE — 84153 ASSAY OF PSA TOTAL: CPT

## 2024-08-19 ENCOUNTER — OFFICE VISIT (OUTPATIENT)
Dept: UROLOGY | Age: 59
End: 2024-08-19
Payer: COMMERCIAL

## 2024-08-19 VITALS
TEMPERATURE: 97.4 F | RESPIRATION RATE: 16 BRPM | BODY MASS INDEX: 34.39 KG/M2 | HEART RATE: 74 BPM | DIASTOLIC BLOOD PRESSURE: 78 MMHG | OXYGEN SATURATION: 94 % | HEIGHT: 69 IN | SYSTOLIC BLOOD PRESSURE: 136 MMHG

## 2024-08-19 DIAGNOSIS — N52.01 ERECTILE DYSFUNCTION DUE TO ARTERIAL INSUFFICIENCY: ICD-10-CM

## 2024-08-19 DIAGNOSIS — R97.20 ELEVATED PSA: Primary | ICD-10-CM

## 2024-08-19 DIAGNOSIS — R39.15 URGENCY OF URINATION: ICD-10-CM

## 2024-08-19 PROCEDURE — 3075F SYST BP GE 130 - 139MM HG: CPT | Performed by: UROLOGY

## 2024-08-19 PROCEDURE — 3078F DIAST BP <80 MM HG: CPT | Performed by: UROLOGY

## 2024-08-19 PROCEDURE — 99214 OFFICE O/P EST MOD 30 MIN: CPT | Performed by: UROLOGY

## 2024-08-19 RX ORDER — SILDENAFIL 100 MG/1
100 TABLET, FILM COATED ORAL DAILY PRN
Qty: 30 TABLET | Refills: 5 | Status: CANCELLED | OUTPATIENT
Start: 2024-08-19

## 2024-08-19 RX ORDER — TADALAFIL 20 MG/1
20 TABLET ORAL DAILY PRN
Qty: 30 TABLET | Refills: 1 | Status: SHIPPED | OUTPATIENT
Start: 2024-08-19

## 2024-08-19 RX ORDER — MIRABEGRON 50 MG/1
50 TABLET, FILM COATED, EXTENDED RELEASE ORAL DAILY
Qty: 30 TABLET | Refills: 5 | Status: CANCELLED | OUTPATIENT
Start: 2024-08-19

## 2024-08-19 RX ORDER — PROPRANOLOL HYDROCHLORIDE 20 MG/1
TABLET ORAL
COMMUNITY
Start: 2024-04-04

## 2024-08-19 ASSESSMENT — ENCOUNTER SYMPTOMS
VOMITING: 0
DIARRHEA: 0
ABDOMINAL PAIN: 0
BACK PAIN: 0
CONSTIPATION: 0
WHEEZING: 0
SHORTNESS OF BREATH: 0
EYE PAIN: 0
COUGH: 0
EYE REDNESS: 0
NAUSEA: 0

## 2024-08-19 NOTE — PROGRESS NOTES
[facility] PHYSICIANS [facility] UROLOGY CENTER  [address]  [facility] [address]  Dept: [phone]    [facility] Urology Office Note - Established    Patient:  Ernesto Murphy  YOB: 1965  Date: 8/19/2024    The patient is a 58 y.o. male who presents todayfor evaluation of the following problems:   Chief Complaint   Patient presents with    BPH with obstruction/lower urinary tract symptoms       HPI  This is a 58-year-old gentleman with a history of BPH.  He has had a laser vaporization of prostate and continues to do well.  Although he is having some urgency, it is tolerable.  He has been on Myrbetriq in the past but ran out and he does not feel that he needs to be on it right now.  He has had a persistently elevated PSA.  He has had multiple biopsies.  His PSA currently is 6.5.  He does also suffer from erectile dysfunction and takes sildenafil with unsatisfactory results.    Summary of old records: N/A    Additional History: N/A    Procedures Today: N/A    Urinalysis today:  No results found for this visit on 08/19/24.  Last several PSA's:  Lab Results   Component Value Date    PSA 6.50 (H) 08/14/2024    PSA 5.61 (H) 02/13/2024    PSA 7.19 (H) 09/12/2022     Last total testosterone:  No results found for: \"TESTOSTERONE\"    AUA Symptom Score (8/19/2024):                               Last BUN and creatinine:  Lab Results   Component Value Date    BUN 15 01/23/2023     Lab Results   Component Value Date    CREATININE 0.84 01/23/2023       Additional Lab/Culture results: none    Imaging Reviewed during this Office Visit: none  (results were independently reviewed by physician and radiology report verified)    PAST MEDICAL, FAMILY AND SOCIAL HISTORY UPDATE:  Past Medical History:   Diagnosis Date    BPH associated with nocturia     Diverticulitis     Essential hypertension 03/04/2020    denies, no PCP    Knee pain 1989    Left - multiple treatments     Problem: Ineffective Coping  Goal: Participates in unit activities  Interventions:  - Provide therapeutic environment   - Provide required programming   - Redirect inappropriate behaviors     Outcome: Progressing

## 2025-01-15 DIAGNOSIS — R97.20 ELEVATED PSA: Primary | ICD-10-CM

## 2025-02-14 ENCOUNTER — TELEPHONE (OUTPATIENT)
Dept: UROLOGY | Age: 60
End: 2025-02-14

## 2025-02-14 NOTE — TELEPHONE ENCOUNTER
Writer called pt to remind them of appt on 2/24 and to have PSA prior to visit.   He will have done next week.

## 2025-02-18 ENCOUNTER — HOSPITAL ENCOUNTER (OUTPATIENT)
Age: 60
Discharge: HOME | End: 2025-02-18
Payer: COMMERCIAL

## 2025-02-18 DIAGNOSIS — R97.20 ELEVATED PSA: ICD-10-CM

## 2025-02-18 LAB — PSA SERPL-MCNC: 7.4 NG/ML (ref 0–4)

## 2025-02-18 PROCEDURE — 36415 COLL VENOUS BLD VENIPUNCTURE: CPT

## 2025-02-18 PROCEDURE — 84153 ASSAY OF PSA TOTAL: CPT

## 2025-02-24 ENCOUNTER — OFFICE VISIT (OUTPATIENT)
Dept: UROLOGY | Age: 60
End: 2025-02-24
Payer: COMMERCIAL

## 2025-02-24 VITALS
HEIGHT: 69 IN | TEMPERATURE: 97.8 F | SYSTOLIC BLOOD PRESSURE: 120 MMHG | OXYGEN SATURATION: 95 % | WEIGHT: 231 LBS | HEART RATE: 72 BPM | DIASTOLIC BLOOD PRESSURE: 82 MMHG | BODY MASS INDEX: 34.21 KG/M2

## 2025-02-24 DIAGNOSIS — N40.1 BPH WITH OBSTRUCTION/LOWER URINARY TRACT SYMPTOMS: ICD-10-CM

## 2025-02-24 DIAGNOSIS — R97.20 ELEVATED PSA: ICD-10-CM

## 2025-02-24 DIAGNOSIS — N52.01 ERECTILE DYSFUNCTION DUE TO ARTERIAL INSUFFICIENCY: Primary | ICD-10-CM

## 2025-02-24 DIAGNOSIS — N13.8 BPH WITH OBSTRUCTION/LOWER URINARY TRACT SYMPTOMS: ICD-10-CM

## 2025-02-24 PROCEDURE — 99214 OFFICE O/P EST MOD 30 MIN: CPT | Performed by: UROLOGY

## 2025-02-24 PROCEDURE — 3074F SYST BP LT 130 MM HG: CPT | Performed by: UROLOGY

## 2025-02-24 PROCEDURE — 3079F DIAST BP 80-89 MM HG: CPT | Performed by: UROLOGY

## 2025-02-24 ASSESSMENT — ENCOUNTER SYMPTOMS
COUGH: 0
BACK PAIN: 0
ALLERGIC/IMMUNOLOGIC NEGATIVE: 1
GASTROINTESTINAL NEGATIVE: 1
RESPIRATORY NEGATIVE: 1
COLOR CHANGE: 0
NAUSEA: 0
WHEEZING: 0
VOMITING: 0
EYE REDNESS: 0
EYES NEGATIVE: 1
EYE PAIN: 0
ABDOMINAL PAIN: 0
SHORTNESS OF BREATH: 0

## 2025-02-24 NOTE — PROGRESS NOTES
[facility] PHYSICIANS [facility] UROLOGY CENTER  [address]  [facility] [address]  Dept: [phone]    [facility] Urology Office Note - Established    Patient:  Ernesto Murphy  YOB: 1965  Date: 2/24/2025    The patient is a 59 y.o. male who presents todayfor evaluation of the following problems:   Chief Complaint   Patient presents with    Elevated PSA      6 month with PSA       HPI  This is a 59-year-old gentleman with a history of BPH.  He is voiding well status post laser vaporization of the prostate.  He does have a persistently elevated PSA.  His PSA has bumped up to 7.4.  He also has ED.    Summary of old records: N/A    Additional History: N/A    Procedures Today: N/A    Urinalysis today:  No results found for this visit on 02/24/25.  Last several PSA's:  Lab Results   Component Value Date    PSA 7.40 (H) 02/18/2025    PSA 6.50 (H) 08/14/2024    PSA 5.61 (H) 02/13/2024     Last total testosterone:  No results found for: \"TESTOSTERONE\"    AUA Symptom Score (2/24/2025):                               Last BUN and creatinine:  Lab Results   Component Value Date    BUN 15 01/23/2023     Lab Results   Component Value Date    CREATININE 0.84 01/23/2023       Additional Lab/Culture results: none    Imaging Reviewed during this Office Visit: none  (results were independently reviewed by physician and radiology report verified)    PAST MEDICAL, FAMILY AND SOCIAL HISTORY UPDATE:  Past Medical History:   Diagnosis Date    BPH associated with nocturia     Diverticulitis     Essential hypertension 03/04/2020    denies, no PCP    Knee pain 1989    Left - multiple treatments    Tobacco abuse     Under care of team 01/23/2023    UROLOGY - DR. VAZQUEZ - last visit 1/2023    Wears glasses 01/23/2023    States has prescription but does not wear.    Wellness examination 01/23/2023    PCP -DR. GEOFF MCEKON - appt 1/23/2023     Past Surgical History:   Procedure

## 2025-03-08 ENCOUNTER — HOSPITAL ENCOUNTER (OUTPATIENT)
Dept: MRI IMAGING | Age: 60
Discharge: HOME | End: 2025-03-10
Attending: UROLOGY
Payer: COMMERCIAL

## 2025-03-08 DIAGNOSIS — R97.20 ELEVATED PSA: ICD-10-CM

## 2025-03-08 PROCEDURE — A9579 GAD-BASE MR CONTRAST NOS,1ML: HCPCS | Performed by: UROLOGY

## 2025-03-08 PROCEDURE — 2500000003 HC RX 250 WO HCPCS: Performed by: UROLOGY

## 2025-03-08 PROCEDURE — 2580000003 HC RX 258: Performed by: UROLOGY

## 2025-03-08 PROCEDURE — 72197 MRI PELVIS W/O & W/DYE: CPT

## 2025-03-08 PROCEDURE — 6360000004 HC RX CONTRAST MEDICATION: Performed by: UROLOGY

## 2025-03-08 RX ORDER — 0.9 % SODIUM CHLORIDE 0.9 %
50 INTRAVENOUS SOLUTION INTRAVENOUS ONCE
Status: COMPLETED | OUTPATIENT
Start: 2025-03-08 | End: 2025-03-08

## 2025-03-08 RX ORDER — SODIUM CHLORIDE 0.9 % (FLUSH) 0.9 %
10 SYRINGE (ML) INJECTION PRN
Status: DISCONTINUED | OUTPATIENT
Start: 2025-03-08 | End: 2025-03-11 | Stop reason: HOSPADM

## 2025-03-08 RX ADMIN — GADOTERIDOL 20 ML: 279.3 INJECTION, SOLUTION INTRAVENOUS at 11:26

## 2025-03-08 RX ADMIN — SODIUM CHLORIDE 50 ML: 9 INJECTION, SOLUTION INTRAVENOUS at 11:26

## 2025-03-08 RX ADMIN — SODIUM CHLORIDE, PRESERVATIVE FREE 10 ML: 5 INJECTION INTRAVENOUS at 11:27

## 2025-03-11 ENCOUNTER — RESULTS FOLLOW-UP (OUTPATIENT)
Dept: MRI IMAGING | Age: 60
End: 2025-03-11

## 2025-03-17 ENCOUNTER — TELEPHONE (OUTPATIENT)
Dept: UROLOGY | Age: 60
End: 2025-03-17

## 2025-03-17 ENCOUNTER — PREP FOR PROCEDURE (OUTPATIENT)
Dept: UROLOGY | Age: 60
End: 2025-03-17

## 2025-03-17 ENCOUNTER — OFFICE VISIT (OUTPATIENT)
Dept: UROLOGY | Age: 60
End: 2025-03-17
Payer: COMMERCIAL

## 2025-03-17 VITALS
DIASTOLIC BLOOD PRESSURE: 83 MMHG | HEIGHT: 69 IN | WEIGHT: 230 LBS | SYSTOLIC BLOOD PRESSURE: 122 MMHG | BODY MASS INDEX: 34.07 KG/M2 | HEART RATE: 69 BPM | OXYGEN SATURATION: 95 % | TEMPERATURE: 97.2 F

## 2025-03-17 DIAGNOSIS — R93.89 ABNORMAL MRI: ICD-10-CM

## 2025-03-17 DIAGNOSIS — N13.8 BPH WITH OBSTRUCTION/LOWER URINARY TRACT SYMPTOMS: ICD-10-CM

## 2025-03-17 DIAGNOSIS — R97.20 ELEVATED PSA: Primary | ICD-10-CM

## 2025-03-17 DIAGNOSIS — N40.1 BPH WITH OBSTRUCTION/LOWER URINARY TRACT SYMPTOMS: ICD-10-CM

## 2025-03-17 PROCEDURE — 99214 OFFICE O/P EST MOD 30 MIN: CPT | Performed by: UROLOGY

## 2025-03-17 PROCEDURE — 3079F DIAST BP 80-89 MM HG: CPT | Performed by: UROLOGY

## 2025-03-17 PROCEDURE — 3074F SYST BP LT 130 MM HG: CPT | Performed by: UROLOGY

## 2025-03-17 RX ORDER — CIPROFLOXACIN 500 MG/1
500 TABLET, FILM COATED ORAL 2 TIMES DAILY
Qty: 6 TABLET | Refills: 0 | Status: SHIPPED | OUTPATIENT
Start: 2025-03-17

## 2025-03-17 ASSESSMENT — ENCOUNTER SYMPTOMS
WHEEZING: 0
EYE PAIN: 0
SHORTNESS OF BREATH: 0
DIARRHEA: 0
ABDOMINAL PAIN: 0
NAUSEA: 0
CONSTIPATION: 0
EYE REDNESS: 0
BACK PAIN: 0
COUGH: 0
VOMITING: 0

## 2025-03-17 NOTE — PROGRESS NOTES
Review of Systems   Constitutional:  Negative for chills, fatigue and fever.   Eyes:  Negative for pain, redness and visual disturbance.   Respiratory:  Negative for cough, shortness of breath and wheezing.    Cardiovascular:  Negative for chest pain and leg swelling.   Gastrointestinal:  Negative for abdominal pain, constipation, diarrhea, nausea and vomiting.   Genitourinary:  Negative for difficulty urinating, dysuria, flank pain, frequency, hematuria, scrotal swelling, testicular pain and urgency.   Musculoskeletal:  Negative for back pain, joint swelling and myalgias.   Skin:  Negative for rash and wound.   Neurological:  Negative for dizziness, tremors, weakness and numbness.   Hematological:  Does not bruise/bleed easily.     
Comment: no       REVIEW OF SYSTEMS:  Review of Systems    Physical Exam:      Vitals:    03/17/25 0909   BP: 122/83   Pulse: 69   Temp: 97.2 °F (36.2 °C)   SpO2: 95%     Body mass index is 33.95 kg/m².  Patient is a 59 y.o. male in no acute distress and alert and oriented to person, place and time.  Physical Exam  Constitutional: Patient in no acute distress.  Neuro: Alert and oriented to person, place and time.  Psych: Mood normal, affect normal  Skin: No rash noted      Assessment and Plan      1. Elevated PSA    2. BPH with obstruction/lower urinary tract symptoms           Plan:   Fusion prostate bx [facility]   Assessment & Plan   Return for Fusion prostate bx [facility].    Prescriptions Ordered:  Orders Placed This Encounter   Medications    ciprofloxacin (CIPRO) 500 MG tablet     Sig: Take 1 tablet by mouth 2 times daily Take first dose 1 day prior to prostate biopsy and then twice a day thereafter until completed     Dispense:  6 tablet     Refill:  0     Orders Placed:  No orders of the defined types were placed in this encounter.          Jimmy Cerrato MD    Agree with the ROS entered by the MA.

## 2025-03-17 NOTE — TELEPHONE ENCOUNTER
Fusion prostate bx & us @ [facility] 4/4/25 11:00am **STOP BLOOD THINNERS 3/28/25**   PAT same day         Spoke with Melanie (spouse), procedure info emailed

## 2025-04-03 NOTE — H&P
Pre-op History and Physical      Patient:  Ernesto Murphy  MRN: [id number]  YOB: 1965    HISTORY OF PRESENT ILLNESS:     The patient is a 59 y.o. male who presents s/p greenlight with elevated PSA asnd pirads 3 lesion. Here for Fusion prostate biopsy with US.    Patient's old records, notes and chart reviewed and summarized above.     I independently reviewed the images and verified the radiology reports from:    MRI PROSTATE W WO CONTRAST  Result Date: 3/10/2025    1. 1.3 cm nodule in the left mid gland posterior transition zone with PI-RADS score 3. 2. Estimated prostate gland volume 53 mL.         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    Under care of team 01/23/2023    Urology- Dr. Cerrato - last visit 3/17/2025    Under care of team     Neurology - Dr. Witt - [facility] - only  visit 4/2024 for tremor    Wears glasses 01/23/2023    States has prescription but does not wear.    Wellness examination 01/23/2023    PCP -DR. GEOFF MCKEON       Past Surgical History:    Past Surgical History:   Procedure Laterality Date    ANTERIOR CRUCIATE LIGAMENT REPAIR Left     APPENDECTOMY      CHOLECYSTECTOMY      COLECTOMY  2009    colostomy for ruptured bowel    COLON SURGERY      colostomy reversal    COLONOSCOPY      multiple    COLONOSCOPY  05/27/2015    PROSTATE BIOPSY N/A 01/27/2021    FUSION PROSTATE BIOPSY, OFFICE NOTIFYING ULTRASOUND performed by Jimmy Cerrato MD at [facility] OR    TURP N/A 2/2/2023    CYSTOSCOPY GREENLIGHT PVP ([facility] CONF #[id number] - [facility]) performed by Jimmy Cerrato  prostate biopsy with US in OR today.    Consent obtained

## 2025-04-04 ENCOUNTER — ANESTHESIA EVENT (OUTPATIENT)
Dept: OPERATING ROOM | Age: 60
End: 2025-04-04
Payer: COMMERCIAL

## 2025-04-04 ENCOUNTER — HOSPITAL ENCOUNTER (OUTPATIENT)
Dept: ULTRASOUND IMAGING | Age: 60
Setting detail: OUTPATIENT SURGERY
End: 2025-04-04
Attending: UROLOGY
Payer: COMMERCIAL

## 2025-04-04 ENCOUNTER — HOSPITAL ENCOUNTER (OUTPATIENT)
Age: 60
Setting detail: OUTPATIENT SURGERY
Discharge: HOME | End: 2025-04-04
Attending: UROLOGY | Admitting: UROLOGY
Payer: COMMERCIAL

## 2025-04-04 ENCOUNTER — ANESTHESIA (OUTPATIENT)
Dept: OPERATING ROOM | Age: 60
End: 2025-04-04
Payer: COMMERCIAL

## 2025-04-04 VITALS
TEMPERATURE: 97.3 F | HEART RATE: 69 BPM | RESPIRATION RATE: 20 BRPM | DIASTOLIC BLOOD PRESSURE: 82 MMHG | OXYGEN SATURATION: 97 % | SYSTOLIC BLOOD PRESSURE: 123 MMHG

## 2025-04-04 DIAGNOSIS — R97.20 ELEVATED PSA: ICD-10-CM

## 2025-04-04 DIAGNOSIS — R93.89 ABNORMAL MRI: ICD-10-CM

## 2025-04-04 PROCEDURE — 88305 TISSUE EXAM BY PATHOLOGIST: CPT

## 2025-04-04 PROCEDURE — 3600000012 HC SURGERY LEVEL 2 ADDTL 15MIN: Performed by: UROLOGY

## 2025-04-04 PROCEDURE — 3600000002 HC SURGERY LEVEL 2 BASE: Performed by: UROLOGY

## 2025-04-04 PROCEDURE — 6360000002 HC RX W HCPCS

## 2025-04-04 PROCEDURE — 88344 IMHCHEM/IMCYTCHM EA MLT ANTB: CPT

## 2025-04-04 PROCEDURE — 3700000001 HC ADD 15 MINUTES (ANESTHESIA): Performed by: UROLOGY

## 2025-04-04 PROCEDURE — 7100000011 HC PHASE II RECOVERY - ADDTL 15 MIN: Performed by: UROLOGY

## 2025-04-04 PROCEDURE — 2709999900 HC NON-CHARGEABLE SUPPLY: Performed by: UROLOGY

## 2025-04-04 PROCEDURE — 7100000010 HC PHASE II RECOVERY - FIRST 15 MIN: Performed by: UROLOGY

## 2025-04-04 PROCEDURE — 2580000003 HC RX 258

## 2025-04-04 PROCEDURE — 76942 ECHO GUIDE FOR BIOPSY: CPT

## 2025-04-04 PROCEDURE — 6360000002 HC RX W HCPCS: Performed by: UROLOGY

## 2025-04-04 PROCEDURE — 3700000000 HC ANESTHESIA ATTENDED CARE: Performed by: UROLOGY

## 2025-04-04 RX ORDER — METOCLOPRAMIDE HYDROCHLORIDE 5 MG/ML
10 INJECTION INTRAMUSCULAR; INTRAVENOUS
Status: CANCELLED | OUTPATIENT
Start: 2025-04-04

## 2025-04-04 RX ORDER — SODIUM CHLORIDE 0.9 % (FLUSH) 0.9 %
5-40 SYRINGE (ML) INJECTION PRN
Status: CANCELLED | OUTPATIENT
Start: 2025-04-04

## 2025-04-04 RX ORDER — FENTANYL CITRATE 50 UG/ML
INJECTION, SOLUTION INTRAMUSCULAR; INTRAVENOUS
Status: DISCONTINUED | OUTPATIENT
Start: 2025-04-04 | End: 2025-04-04 | Stop reason: SDUPTHER

## 2025-04-04 RX ORDER — LIDOCAINE HYDROCHLORIDE 10 MG/ML
INJECTION, SOLUTION EPIDURAL; INFILTRATION; INTRACAUDAL; PERINEURAL
Status: DISCONTINUED | OUTPATIENT
Start: 2025-04-04 | End: 2025-04-04 | Stop reason: SDUPTHER

## 2025-04-04 RX ORDER — LABETALOL HYDROCHLORIDE 5 MG/ML
10 INJECTION, SOLUTION INTRAVENOUS
Status: CANCELLED | OUTPATIENT
Start: 2025-04-04

## 2025-04-04 RX ORDER — FENTANYL CITRATE 50 UG/ML
25 INJECTION, SOLUTION INTRAMUSCULAR; INTRAVENOUS EVERY 5 MIN PRN
Refills: 0 | Status: CANCELLED | OUTPATIENT
Start: 2025-04-04

## 2025-04-04 RX ORDER — SODIUM CHLORIDE 9 MG/ML
INJECTION, SOLUTION INTRAVENOUS PRN
Status: CANCELLED | OUTPATIENT
Start: 2025-04-04

## 2025-04-04 RX ORDER — ONDANSETRON 2 MG/ML
4 INJECTION INTRAMUSCULAR; INTRAVENOUS
Status: CANCELLED | OUTPATIENT
Start: 2025-04-04

## 2025-04-04 RX ORDER — SODIUM CHLORIDE 0.9 % (FLUSH) 0.9 %
5-40 SYRINGE (ML) INJECTION EVERY 12 HOURS SCHEDULED
Status: CANCELLED | OUTPATIENT
Start: 2025-04-04

## 2025-04-04 RX ORDER — IPRATROPIUM BROMIDE AND ALBUTEROL SULFATE 2.5; .5 MG/3ML; MG/3ML
1 SOLUTION RESPIRATORY (INHALATION)
Status: CANCELLED | OUTPATIENT
Start: 2025-04-04

## 2025-04-04 RX ORDER — LIDOCAINE HYDROCHLORIDE 10 MG/ML
INJECTION, SOLUTION INFILTRATION; PERINEURAL PRN
Status: DISCONTINUED | OUTPATIENT
Start: 2025-04-04 | End: 2025-04-04 | Stop reason: HOSPADM

## 2025-04-04 RX ORDER — HYDRALAZINE HYDROCHLORIDE 20 MG/ML
10 INJECTION INTRAMUSCULAR; INTRAVENOUS
Status: CANCELLED | OUTPATIENT
Start: 2025-04-04

## 2025-04-04 RX ORDER — PROPOFOL 10 MG/ML
INJECTION, EMULSION INTRAVENOUS
Status: DISCONTINUED | OUTPATIENT
Start: 2025-04-04 | End: 2025-04-04 | Stop reason: SDUPTHER

## 2025-04-04 RX ORDER — SODIUM CHLORIDE, SODIUM LACTATE, POTASSIUM CHLORIDE, CALCIUM CHLORIDE 600; 310; 30; 20 MG/100ML; MG/100ML; MG/100ML; MG/100ML
INJECTION, SOLUTION INTRAVENOUS
Status: DISCONTINUED | OUTPATIENT
Start: 2025-04-04 | End: 2025-04-04 | Stop reason: SDUPTHER

## 2025-04-04 RX ORDER — NALOXONE HYDROCHLORIDE 0.4 MG/ML
INJECTION, SOLUTION INTRAMUSCULAR; INTRAVENOUS; SUBCUTANEOUS PRN
Status: CANCELLED | OUTPATIENT
Start: 2025-04-04

## 2025-04-04 RX ADMIN — FENTANYL CITRATE 50 MCG: 50 INJECTION, SOLUTION INTRAMUSCULAR; INTRAVENOUS at 10:48

## 2025-04-04 RX ADMIN — FENTANYL CITRATE 50 MCG: 50 INJECTION, SOLUTION INTRAMUSCULAR; INTRAVENOUS at 10:51

## 2025-04-04 RX ADMIN — PROPOFOL 50 MG: 10 INJECTION, EMULSION INTRAVENOUS at 10:45

## 2025-04-04 RX ADMIN — PROPOFOL 50 MG: 10 INJECTION, EMULSION INTRAVENOUS at 10:57

## 2025-04-04 RX ADMIN — PROPOFOL 30 MG: 10 INJECTION, EMULSION INTRAVENOUS at 10:59

## 2025-04-04 RX ADMIN — SODIUM CHLORIDE, POTASSIUM CHLORIDE, SODIUM LACTATE AND CALCIUM CHLORIDE: 600; 310; 30; 20 INJECTION, SOLUTION INTRAVENOUS at 10:40

## 2025-04-04 RX ADMIN — LIDOCAINE HYDROCHLORIDE 50 MG: 10 INJECTION, SOLUTION EPIDURAL; INFILTRATION; INTRACAUDAL; PERINEURAL at 10:45

## 2025-04-04 RX ADMIN — PROPOFOL 50 MG: 10 INJECTION, EMULSION INTRAVENOUS at 11:01

## 2025-04-04 RX ADMIN — PROPOFOL 30 MG: 10 INJECTION, EMULSION INTRAVENOUS at 10:46

## 2025-04-04 RX ADMIN — PROPOFOL 40 MG: 10 INJECTION, EMULSION INTRAVENOUS at 10:48

## 2025-04-04 RX ADMIN — PROPOFOL 50 MG: 10 INJECTION, EMULSION INTRAVENOUS at 10:54

## 2025-04-04 ASSESSMENT — PAIN SCALES - GENERAL
PAINLEVEL_OUTOF10: 0

## 2025-04-04 ASSESSMENT — PAIN - FUNCTIONAL ASSESSMENT: PAIN_FUNCTIONAL_ASSESSMENT: ADULT NONVERBAL PAIN SCALE (NPVS)

## 2025-04-04 NOTE — ANESTHESIA PRE PROCEDURE
Department of Anesthesiology  Preprocedure Note       Name:  Ernesto Murphy   Age:  59 y.o.  :  1965                                          MRN:  [id number]         Date:  2025      Surgeon: Surgeon(s):  Jimmy Cerrato MD    Procedure: Procedure(s):  FUSION PROSTATE BIOPSY ULTRASOUND    Medications prior to admission:   Prior to Admission medications    Medication Sig Start Date End Date Taking? Authorizing Provider   ciprofloxacin (CIPRO) 500 MG tablet Take 1 tablet by mouth 2 times daily Take first dose 1 day prior to prostate biopsy and then twice a day thereafter until completed 3/17/25  Yes Jimmy Cerrato MD   tadalafil (CIALIS) 20 MG tablet Take 1 tablet by mouth daily as needed for Erectile Dysfunction 24  Yes Jimmy Cerrato MD   sildenafil (VIAGRA) 100 MG tablet Take 1 tablet by mouth daily as needed for Erectile Dysfunction 24  Yes Jimmy Cerrato MD   traMADol (ULTRAM) 50 MG tablet Take 1 tablet by mouth every 6 hours as needed for Pain.   Yes Provider, MD Kylah       Current medications:    No current facility-administered medications for this encounter.       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

## 2025-04-04 NOTE — DISCHARGE INSTRUCTIONS
OK to dc home when recovered.  Medications in chart  Pt will Follow up with Dr. Cerrato as scheduled in 1 to 2 weeks to discuss pathology results  Continue taking antibiotic as prescribed      Call your doctor for the following:   Chills   Temperature greater than 101         No alcoholic beverages, no driving or operating machinery, no making important decisions for 24 hours.   You may have a normal diet but should eat lightly day of surgery.  Drink plenty of fluids.  Urinate within 8 hours after surgery, if unable to urinate call your doctor

## 2025-04-04 NOTE — OP NOTE
Operative Note      Patient: Ernesto Murphy  YOB: 1965  MRN: [id number]    Date of Procedure: 4/4/2025    Pre-Op Diagnosis Codes:      * Elevated PSA [R97.20]     * Abnormal MRI [R93.89]    Post-Op Diagnosis: Same       Procedure(s):  FUSION PROSTATE BIOPSY ULTRASOUND    Surgeon(s):  Jimmy Cerrato MD    Assistant:   Resident: Yvon Jones MD    Anesthesia: Monitor Anesthesia Care    Estimated Blood Loss (mL): Minimal    Complications: None    Specimens:   ID Type Source Tests Collected by Time Destination   A : PROSTATE BIOPSY X12 Tissue Prostate SURGICAL PATHOLOGY Jimmy Cerrato MD 4/4/2025 1046    B : LEFT TARGET LESION Tissue Prostate SURGICAL PATHOLOGY Jimmy Cerrato MD 4/4/2025 1047        Implants:  * No implants in log *      Drains: * No LDAs found *    Indication: Ernesto Murphy is a 59 y.o. male with elevated PSA and abnormal MRI. He is here today for biopsy of the prostate.Risks benefits and alternatives goals and possible complications of the procedure were explained to the patient for consent was obtained.  He elected to proceed.    TRUS Prostate biopsy:  Details: Patient was brought back to the operating room table.  He was laid  in the supine position.  EPC cuffs were placed on and functioning prior to induction of anesthesia.  Anesthesia was inducted. A timeout performed.  The ultrasound probe was placed per rectum.  The prostate was brought into view.  As previously noted his prostates proximally 55 g.  Seminal vesicles appeared normal.        We then proceeded with a standard sextant biopsy starting on the right side in the left side for a total of 12 specimens. There was L apical hypoechoic nodule.  These core specimens were sent off for permanent pathology. 8 cores taken from FAISAL. We used 1 percent lidocaine to inject around the neurovascular bundle bilaterally. After completion of the biopsy we then removed the ultrasound probe.  There is no evidence of brisk bleeding per the  LLE/weight-bearing as tolerated

## 2025-04-04 NOTE — ANESTHESIA POSTPROCEDURE EVALUATION
Department of Anesthesiology  Postprocedure Note    Patient: Ernesto Murphy  MRN: [id number]  YOB: 1965  Date of evaluation: 4/4/2025    Procedure Summary       Date: 04/04/25 Room / Location: [address]    Anesthesia Start: 1040 Anesthesia Stop: 1115    Procedure: FUSION PROSTATE BIOPSY ULTRASOUND Diagnosis:       Elevated PSA      Abnormal MRI      (Elevated PSA [R97.20])      (Abnormal MRI [R93.89])    Surgeons: Jimmy Cerrato MD Responsible Provider: Rosie Lance MD    Anesthesia Type: MAC ASA Status: 2            Anesthesia Type: No value filed.    Charlene Phase I: Charlene Score: 10    Charlene Phase II: Charlene Score: 10    Anesthesia Post Evaluation    Patient location during evaluation: PACU  Patient participation: complete - patient participated  Level of consciousness: awake and alert  Pain score: 1  Airway patency: patent  Nausea & Vomiting: no nausea and no vomiting  Cardiovascular status: hemodynamically stable  Respiratory status: acceptable  Hydration status: euvolemic  Pain management: adequate    No notable events documented.

## 2025-04-10 LAB — SURGICAL PATHOLOGY REPORT: NORMAL

## 2025-04-14 ENCOUNTER — OFFICE VISIT (OUTPATIENT)
Dept: UROLOGY | Age: 60
End: 2025-04-14
Payer: COMMERCIAL

## 2025-04-14 VITALS
WEIGHT: 233.8 LBS | TEMPERATURE: 97.5 F | SYSTOLIC BLOOD PRESSURE: 122 MMHG | DIASTOLIC BLOOD PRESSURE: 76 MMHG | HEART RATE: 76 BPM | BODY MASS INDEX: 34.51 KG/M2

## 2025-04-14 DIAGNOSIS — C61 PROSTATE CANCER (HCC): Primary | ICD-10-CM

## 2025-04-14 DIAGNOSIS — R97.20 ELEVATED PSA: ICD-10-CM

## 2025-04-14 PROCEDURE — 99214 OFFICE O/P EST MOD 30 MIN: CPT | Performed by: UROLOGY

## 2025-04-14 PROCEDURE — 3078F DIAST BP <80 MM HG: CPT | Performed by: UROLOGY

## 2025-04-14 PROCEDURE — 3074F SYST BP LT 130 MM HG: CPT | Performed by: UROLOGY

## 2025-04-14 ASSESSMENT — ENCOUNTER SYMPTOMS
EYE REDNESS: 0
ABDOMINAL PAIN: 0
SHORTNESS OF BREATH: 0
EYE PAIN: 0
BACK PAIN: 1
VOMITING: 0
DIARRHEA: 0
COUGH: 0
NAUSEA: 0
CONSTIPATION: 0
WHEEZING: 0

## 2025-04-14 NOTE — PROGRESS NOTES
Review of Systems   Constitutional:  Negative for chills, fatigue and fever.   Eyes:  Negative for pain, redness and visual disturbance.   Respiratory:  Negative for cough, shortness of breath and wheezing.    Cardiovascular:  Negative for chest pain and leg swelling.   Gastrointestinal:  Negative for abdominal pain, constipation, diarrhea, nausea and vomiting.   Genitourinary:  Negative for difficulty urinating, dysuria, flank pain, frequency, hematuria, scrotal swelling, testicular pain and urgency.   Musculoskeletal:  Positive for back pain. Negative for joint swelling and myalgias.   Skin:  Negative for rash and wound.   Neurological:  Negative for dizziness, tremors, weakness and numbness.   Hematological:  Does not bruise/bleed easily.

## 2025-04-14 NOTE — PROGRESS NOTES
[facility]  [address]  [facility] [address]  Dept: [phone]    [facility] Urology Office Note - Established    Patient:  Ernesto Murphy  YOB: 1965  Date: 4/14/2025    The patient is a 59 y.o. male who presents todayfor evaluation of the following problems:   Chief Complaint   Patient presents with    Follow-up      s/p fusion prostate       HPI  This is a very pleasant 59-year-old gentleman who recently underwent fusion prostate biopsy.  He also has a history of BPH and did have a laser vaporization of prostate in the past.  He has been voiding without difficulty.  His prostate biopsy does demonstrate one focus of grade group 1, Rhett 3+3=6  disease.    Summary of old records: N/A    Additional History: N/A    Procedures Today: N/A    Urinalysis today:  No results found for this visit on 04/14/25.  Last several PSA's:  Lab Results   Component Value Date    PSA 7.40 (H) 02/18/2025    PSA 6.50 (H) 08/14/2024    PSA 5.61 (H) 02/13/2024     Last total testosterone:  No results found for: \"TESTOSTERONE\"    AUA Symptom Score (4/14/2025):                               Last BUN and creatinine:  Lab Results   Component Value Date    BUN 15 01/23/2023     Lab Results   Component Value Date    CREATININE 0.84 01/23/2023       Additional Lab/Culture results: none    Imaging Reviewed during this Office Visit: none  (results were independently reviewed by physician and radiology report verified)    PAST MEDICAL, FAMILY AND SOCIAL HISTORY UPDATE:  Past Medical History:   Diagnosis Date    BPH associated with nocturia 2022    Diverticulitis 2015    Elevated PSA 2020    Erectile dysfunction 2020    Essential hypertension 03/04/2020    DENIES 4/3/2025    Kinetic tremor 04/2024    Hand    Knee pain 1989    Left - multiple treatments - ACL repair    Rupture of bowel (HCC) 2009    With colostomy and reversal    Tobacco abuse

## (undated) DEVICE — CYSTO/BLADDER IRRIGATION SET, REGULATING CLAMP

## (undated) DEVICE — GOWN,SIRUS,NONRNF,SETINSLV,XL,20/CS: Brand: MEDLINE

## (undated) DEVICE — LASER SURG W22XH58IN D36IN 475LB SLD STATE FREQ DOUBLED

## (undated) DEVICE — NEEDLE BX ASPIR SPNL TIPCM MRK AND NDL STP 22GAX20CM

## (undated) DEVICE — ELECTRODE PT RET AD L9FT HI MOIST COND ADH HYDRGEL CORDED

## (undated) DEVICE — NEEDLE SPNL 22GA L7IN SPINOCAN

## (undated) DEVICE — RENTAL LASER XPS CASE

## (undated) DEVICE — CATHETER,FOLEY,3-WAY,22FR,30ML,100%SILI: Brand: MEDLINE

## (undated) DEVICE — MAX-CORE® DISPOSABLE CORE BIOPSY INSTRUMENT, 18G X 20CM: Brand: MAX-CORE

## (undated) DEVICE — DRAPE,REIN 53X77,STERILE: Brand: MEDLINE

## (undated) DEVICE — STRAP ARMBRD W1.5XL32IN FOAM STR YET SFT W/ HK AND LOOP

## (undated) DEVICE — MAX-CORE® DISPOSABLE CORE BIOPSY INSTRUMENT, 18G X 25CM: Brand: MAX-CORE

## (undated) DEVICE — MODULE PMP INFUS SET 20 GTT 127 IN 27 CC 20 NEEDLELESS

## (undated) DEVICE — PACK PROCEDURE SURG CYSTO SVMMC LF

## (undated) DEVICE — SEAL ENDO INSTR SELF SEAL UROLOGY

## (undated) DEVICE — DRAINBAG,ANTI-REFLUX TOWER,L/F,2000ML,LL: Brand: MEDLINE

## (undated) DEVICE — STRAP,CATHETER,ELASTIC,HOOK&LOOP: Brand: MEDLINE

## (undated) DEVICE — PLUG,CATHETER,DRAINAGE PROTECTOR,TUBE: Brand: MEDLINE

## (undated) DEVICE — GLOVE ORANGE PI 7 1/2   MSG9075

## (undated) DEVICE — SYRINGE CATH TIP 50ML